# Patient Record
Sex: FEMALE | Race: WHITE | NOT HISPANIC OR LATINO | Employment: OTHER | URBAN - NONMETROPOLITAN AREA
[De-identification: names, ages, dates, MRNs, and addresses within clinical notes are randomized per-mention and may not be internally consistent; named-entity substitution may affect disease eponyms.]

---

## 2023-03-13 NOTE — PROGRESS NOTES
Name:  Marilyn Landry  YOB: 1945  Location: [facility]  Location Address: [address] [place] [address], [facility], [address] [place] [address]  Location Phone: [phone]    Chief Complaint  Chief Complaint   Patient presents with   • Skin Lesion     Scc of forehead        History of Present Illness  The patient  is a 77 y.o. female who is referred by Monica Parks MD for preoperative evaluation. She complains of a lesionright forehead present for 3 month(s).  It has been  biopsied.  Pathology demonstrated a squamous cell carcinoma                       Past Medical History:   Diagnosis Date   • Disease of thyroid gland    • High blood pressure    • High cholesterol    • SCC (squamous cell carcinoma)        Past Surgical History:   Procedure Laterality Date   • GALLBLADDER SURGERY     • HYSTERECTOMY           Current Outpatient Medications:   •  amLODIPine (NORVASC) 10 MG tablet, TAKE ONE TABLET EVERY DAY FOR HIGH BLOOD PRESSURE, Disp: , Rfl:   •  atenolol (TENORMIN) 50 MG tablet, atenolol 50 mg tablet  TAKE 1 TABLET BY MOUTH ONCE DAILY FOR HIGH BLOOD PRESSURE, Disp: , Rfl:   •  colestipol (COLESTID) 1 g tablet, colestipol 1 gram tablet  TAKE 1 TABLET BY MOUTH TWICE DAILY, Disp: , Rfl:   •  dicyclomine (BENTYL) 10 MG capsule, dicyclomine 10 mg capsule  TAKE 1 CAPSULE BY MOUTH THREE TIMES DAILY, Disp: , Rfl:   •  estradiol (ESTRACE) 0.1 MG/GM vaginal cream, INSERT 0.1 MG VAGINALLY EVERY EVENING, Disp: , Rfl:   •  gabapentin (NEURONTIN) 300 MG capsule, gabapentin 300 mg capsule  TAKE 1 CAPSULE BY MOUTH TWICE DAILY AS NEEDED, Disp: , Rfl:   •  hydroCHLOROthiazide (HYDRODIURIL) 25 MG tablet, hydrochlorothiazide 25 mg tablet  TAKE 1 TABLET BY MOUTH ONCE DAILY FOR HIGH BLOOD PRESSURE, Disp: , Rfl:   •  levothyroxine (SYNTHROID, LEVOTHROID) 112 MCG tablet, Synthroid 112 mcg tablet  TAKE 1 TABLET BY MOUTH ONCE DAILY IN THE MORNING ON AN EMPTY STOMACH, Disp: , Rfl:   •  lisinopril (PRINIVIL,ZESTRIL) 40  MG tablet, lisinopril 40 mg tablet  TAKE 1 TABLET BY MOUTH ONCE DAILY FOR HIGH BLOOD PRESSURE, Disp: , Rfl:   •  omeprazole (priLOSEC) 20 MG capsule, omeprazole 20 mg capsule,delayed release  TAKE 1 CAPSULE BY MOUTH ONCE DAILY FOR GERD, Disp: , Rfl:   •  pravastatin (PRAVACHOL) 20 MG tablet, pravastatin 20 mg tablet  TAKE 1 TABLET BY MOUTH EVERY DAY AT BEDTIME TO LOWER CHOLESTEROL, Disp: , Rfl:     Penicillins    History reviewed. No pertinent family history.    Social History     Socioeconomic History   • Marital status:    Tobacco Use   • Smoking status: Never   • Smokeless tobacco: Never   Substance and Sexual Activity   • Alcohol use: Never       Review of Systems   Constitutional: Negative.    HENT:        Admits lesion to the forehead   Respiratory: Negative.    Cardiovascular: Negative.    Gastrointestinal: Negative.    Genitourinary: Negative.    Neurological: Negative.        Vitals:    03/14/23 1618   BP: 136/74   Pulse: 65   Resp: 20   Temp: 98 °F (36.7 °C)       Objective     Physical Exam  Vitals reviewed.   Constitutional:       Appearance: Normal appearance. She is normal weight.   HENT:      Head: Normocephalic.        Right Ear: Hearing and external ear normal.      Left Ear: Hearing and external ear normal.      Nose: Nose normal.      Mouth/Throat:      Lips: Pink.      Mouth: Mucous membranes are moist.      Pharynx: Oropharynx is clear. Uvula midline.   Musculoskeletal:      Cervical back: Full passive range of motion without pain.   Neurological:      Mental Status: She is alert.   Psychiatric:         Behavior: Behavior is cooperative.         Assessment & Plan   Diagnoses and all orders for this visit:    1. 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 Abnormal coagulation profile    Other orders  -     Follow Anesthesia Guidelines / Protocol; Future  -     Obtain Informed Consent  -     Follow Anesthesia Guidelines / Protocol; Standing  -     Verify NPO Status; Standing  -     Obtain Informed Consent; Standing  -     SCD (Sequential Compression Device) - To Be Placed on Patient in Pre-Op; Standing      EXCISION OF SQUAMOUS CELL CARCINOMA OF THE RIGHT FOREHEAD WITH POSSIBLE FLAP OR GRAFT (Right)  Orders Placed This Encounter   Procedures   • XR Chest 2 View     Standing Status:   Future     Standing Expiration Date:   3/13/2024     Order Specific Question:   Reason for Exam:     Answer:   PRE OPERATIVE   • Comprehensive Metabolic Panel     Standing Status:   Future     Standing Expiration Date:   3/13/2024     Order Specific Question:   Release to patient     Answer:   Routine Release   • Protime-INR     Standing Status:   Future     Standing Expiration Date:   3/13/2024   • APTT     Standing Status:   Future     Standing Expiration Date:   3/13/2024   • Obtain Informed Consent     EXCISION LESION with possible flap or graft-     Order Specific Question:   Informed Consent Given For     Answer:   EXCISION LESION with possible flap or graft-   • ECG 12 Lead     Standing Status:   Future     Standing Expiration Date:   3/13/2024     Order Specific Question:   Reason for Exam:     Answer:   EXCISION LESION   • CBC and Differential     Standing Status:   Future     Standing Expiration Date:   3/13/2024     Order Specific Question:   Manual Differential     Answer:   No     The risks, benefits and options of the procedure were explained to the patient. The possiblity of a persistent cosmetic defect as well as a \"flap\" or a graft to close the defect was discussed. The patient was instructed to stop all aspirin, NSAIDs and VIT E etc.  If appropriate, clearance with primary MD or specialist will be obtained preoperatively.  The patient was " "scheduled for a LOCAL in the office.    For instructions on the proper use, care and disposal of controled substances, ask you doctor or refer to the [place] Board of Medicine website: http://kbml.[place].gov/hb1/Pages/Considerations-For-Patient-Education.aspx   Dr. Parks examined and discussed care with patient and agrees with treatment plan.     Return for post op.       Patient Instructions   The risks, benefits and options of the procedure were explained to the patient. The possiblity of a persistent cosmetic defect as well as a \"flap\" or a graft to close the defect was discussed. The patient was instructed to stop all aspirin, NSAIDs and VIT E etc.  If appropriate, clearance with primary MD or specialist will be obtained preoperatively.  The patient was scheduled for a LOCAL in the office.    For instructions on the proper use, care and disposal of controled substances, ask you doctor or refer to the [place] Board of Medicine website: http://kbml.[place].gov/hb1/Pages/Considerations-For-Patient-Education.aspx             "

## 2023-03-13 NOTE — H&P (VIEW-ONLY)
Name:  Marilyn Landry  YOB: 1945  Location: [facility]  Location Address: [address] [place] [address], [facility], [address] [place] [address]  Location Phone: [phone]    Chief Complaint  Chief Complaint   Patient presents with   • Skin Lesion     Scc of forehead        History of Present Illness  The patient  is a 77 y.o. female who is referred by Monica Parks MD for preoperative evaluation. She complains of a lesionright forehead present for 3 month(s).  It has been  biopsied.  Pathology demonstrated a squamous cell carcinoma                       Past Medical History:   Diagnosis Date   • Disease of thyroid gland    • High blood pressure    • High cholesterol    • SCC (squamous cell carcinoma)        Past Surgical History:   Procedure Laterality Date   • GALLBLADDER SURGERY     • HYSTERECTOMY           Current Outpatient Medications:   •  amLODIPine (NORVASC) 10 MG tablet, TAKE ONE TABLET EVERY DAY FOR HIGH BLOOD PRESSURE, Disp: , Rfl:   •  atenolol (TENORMIN) 50 MG tablet, atenolol 50 mg tablet  TAKE 1 TABLET BY MOUTH ONCE DAILY FOR HIGH BLOOD PRESSURE, Disp: , Rfl:   •  colestipol (COLESTID) 1 g tablet, colestipol 1 gram tablet  TAKE 1 TABLET BY MOUTH TWICE DAILY, Disp: , Rfl:   •  dicyclomine (BENTYL) 10 MG capsule, dicyclomine 10 mg capsule  TAKE 1 CAPSULE BY MOUTH THREE TIMES DAILY, Disp: , Rfl:   •  estradiol (ESTRACE) 0.1 MG/GM vaginal cream, INSERT 0.1 MG VAGINALLY EVERY EVENING, Disp: , Rfl:   •  gabapentin (NEURONTIN) 300 MG capsule, gabapentin 300 mg capsule  TAKE 1 CAPSULE BY MOUTH TWICE DAILY AS NEEDED, Disp: , Rfl:   •  hydroCHLOROthiazide (HYDRODIURIL) 25 MG tablet, hydrochlorothiazide 25 mg tablet  TAKE 1 TABLET BY MOUTH ONCE DAILY FOR HIGH BLOOD PRESSURE, Disp: , Rfl:   •  levothyroxine (SYNTHROID, LEVOTHROID) 112 MCG tablet, Synthroid 112 mcg tablet  TAKE 1 TABLET BY MOUTH ONCE DAILY IN THE MORNING ON AN EMPTY STOMACH, Disp: , Rfl:   •  lisinopril (PRINIVIL,ZESTRIL) 40  MG tablet, lisinopril 40 mg tablet  TAKE 1 TABLET BY MOUTH ONCE DAILY FOR HIGH BLOOD PRESSURE, Disp: , Rfl:   •  omeprazole (priLOSEC) 20 MG capsule, omeprazole 20 mg capsule,delayed release  TAKE 1 CAPSULE BY MOUTH ONCE DAILY FOR GERD, Disp: , Rfl:   •  pravastatin (PRAVACHOL) 20 MG tablet, pravastatin 20 mg tablet  TAKE 1 TABLET BY MOUTH EVERY DAY AT BEDTIME TO LOWER CHOLESTEROL, Disp: , Rfl:     Penicillins    History reviewed. No pertinent family history.    Social History     Socioeconomic History   • Marital status:    Tobacco Use   • Smoking status: Never   • Smokeless tobacco: Never   Substance and Sexual Activity   • Alcohol use: Never       Review of Systems   Constitutional: Negative.    HENT:        Admits lesion to the forehead   Respiratory: Negative.    Cardiovascular: Negative.    Gastrointestinal: Negative.    Genitourinary: Negative.    Neurological: Negative.        Vitals:    03/14/23 1618   BP: 136/74   Pulse: 65   Resp: 20   Temp: 98 °F (36.7 °C)       Objective     Physical Exam  Vitals reviewed.   Constitutional:       Appearance: Normal appearance. She is normal weight.   HENT:      Head: Normocephalic.        Right Ear: Hearing and external ear normal.      Left Ear: Hearing and external ear normal.      Nose: Nose normal.      Mouth/Throat:      Lips: Pink.      Mouth: Mucous membranes are moist.      Pharynx: Oropharynx is clear. Uvula midline.   Musculoskeletal:      Cervical back: Full passive range of motion without pain.   Neurological:      Mental Status: She is alert.   Psychiatric:         Behavior: Behavior is cooperative.         Assessment & Plan   Diagnoses and all orders for this visit:    1. 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 Abnormal coagulation profile    Other orders  -     Follow Anesthesia Guidelines / Protocol; Future  -     Obtain Informed Consent  -     Follow Anesthesia Guidelines / Protocol; Standing  -     Verify NPO Status; Standing  -     Obtain Informed Consent; Standing  -     SCD (Sequential Compression Device) - To Be Placed on Patient in Pre-Op; Standing      EXCISION OF SQUAMOUS CELL CARCINOMA OF THE RIGHT FOREHEAD WITH POSSIBLE FLAP OR GRAFT (Right)  Orders Placed This Encounter   Procedures   • XR Chest 2 View     Standing Status:   Future     Standing Expiration Date:   3/13/2024     Order Specific Question:   Reason for Exam:     Answer:   PRE OPERATIVE   • Comprehensive Metabolic Panel     Standing Status:   Future     Standing Expiration Date:   3/13/2024     Order Specific Question:   Release to patient     Answer:   Routine Release   • Protime-INR     Standing Status:   Future     Standing Expiration Date:   3/13/2024   • APTT     Standing Status:   Future     Standing Expiration Date:   3/13/2024   • Obtain Informed Consent     EXCISION LESION with possible flap or graft-     Order Specific Question:   Informed Consent Given For     Answer:   EXCISION LESION with possible flap or graft-   • ECG 12 Lead     Standing Status:   Future     Standing Expiration Date:   3/13/2024     Order Specific Question:   Reason for Exam:     Answer:   EXCISION LESION   • CBC and Differential     Standing Status:   Future     Standing Expiration Date:   3/13/2024     Order Specific Question:   Manual Differential     Answer:   No     The risks, benefits and options of the procedure were explained to the patient. The possiblity of a persistent cosmetic defect as well as a \"flap\" or a graft to close the defect was discussed. The patient was instructed to stop all aspirin, NSAIDs and VIT E etc.  If appropriate, clearance with primary MD or specialist will be obtained preoperatively.  The patient was " "scheduled for a LOCAL in the office.    For instructions on the proper use, care and disposal of controled substances, ask you doctor or refer to the [place] Board of Medicine website: http://kbml.[place].gov/hb1/Pages/Considerations-For-Patient-Education.aspx   Dr. Parks examined and discussed care with patient and agrees with treatment plan.     Return for post op.       Patient Instructions   The risks, benefits and options of the procedure were explained to the patient. The possiblity of a persistent cosmetic defect as well as a \"flap\" or a graft to close the defect was discussed. The patient was instructed to stop all aspirin, NSAIDs and VIT E etc.  If appropriate, clearance with primary MD or specialist will be obtained preoperatively.  The patient was scheduled for a LOCAL in the office.    For instructions on the proper use, care and disposal of controled substances, ask you doctor or refer to the [place] Board of Medicine website: http://kbml.[place].gov/hb1/Pages/Considerations-For-Patient-Education.aspx             "

## 2023-03-14 ENCOUNTER — OFFICE VISIT (OUTPATIENT)
Dept: OTOLARYNGOLOGY | Facility: CLINIC | Age: 78
End: 2023-03-14
Payer: MEDICARE

## 2023-03-14 VITALS
HEIGHT: 62 IN | SYSTOLIC BLOOD PRESSURE: 136 MMHG | RESPIRATION RATE: 20 BRPM | BODY MASS INDEX: 26.87 KG/M2 | TEMPERATURE: 98 F | HEART RATE: 65 BPM | WEIGHT: 146 LBS | DIASTOLIC BLOOD PRESSURE: 74 MMHG

## 2023-03-14 DIAGNOSIS — D04.39 SQUAMOUS CELL CARCINOMA IN SITU (SCCIS) OF SKIN OF FOREHEAD: Primary | ICD-10-CM

## 2023-03-14 DIAGNOSIS — R79.1 ABNORMAL COAGULATION PROFILE: ICD-10-CM

## 2023-03-14 PROCEDURE — 99204 OFFICE O/P NEW MOD 45 MIN: CPT | Performed by: NURSE PRACTITIONER

## 2023-03-14 PROCEDURE — 1159F MED LIST DOCD IN RCRD: CPT | Performed by: NURSE PRACTITIONER

## 2023-03-14 PROCEDURE — 1160F RVW MEDS BY RX/DR IN RCRD: CPT | Performed by: NURSE PRACTITIONER

## 2023-03-14 RX ORDER — GABAPENTIN 300 MG/1
300 CAPSULE ORAL DAILY
COMMUNITY

## 2023-03-14 RX ORDER — HYDROCHLOROTHIAZIDE 25 MG/1
25 TABLET ORAL DAILY
COMMUNITY

## 2023-03-14 RX ORDER — AMLODIPINE BESYLATE 10 MG/1
10 TABLET ORAL DAILY
COMMUNITY
Start: 2022-12-12

## 2023-03-14 RX ORDER — ESTRADIOL 0.1 MG/G
2 CREAM VAGINAL AS NEEDED
COMMUNITY
Start: 2022-12-12

## 2023-03-14 RX ORDER — FAMOTIDINE 40 MG/1
TABLET, FILM COATED ORAL EVERY 24 HOURS
COMMUNITY
End: 2023-03-14

## 2023-03-14 RX ORDER — PRAVASTATIN SODIUM 20 MG
20 TABLET ORAL DAILY
COMMUNITY

## 2023-03-14 RX ORDER — ATENOLOL 50 MG/1
50 TABLET ORAL DAILY
COMMUNITY

## 2023-03-14 RX ORDER — DICYCLOMINE HYDROCHLORIDE 10 MG/1
10 CAPSULE ORAL 2 TIMES DAILY PRN
COMMUNITY

## 2023-03-14 RX ORDER — LISINOPRIL 40 MG/1
TABLET ORAL
COMMUNITY
End: 2023-03-29

## 2023-03-14 RX ORDER — MONTELUKAST SODIUM 4 MG/1
1 TABLET, CHEWABLE ORAL 2 TIMES DAILY
COMMUNITY

## 2023-03-14 RX ORDER — LEVOTHYROXINE SODIUM 112 UG/1
112 TABLET ORAL DAILY
COMMUNITY

## 2023-03-14 RX ORDER — OMEPRAZOLE 20 MG/1
20 CAPSULE, DELAYED RELEASE ORAL DAILY
COMMUNITY

## 2023-03-14 NOTE — PATIENT INSTRUCTIONS
"The risks, benefits and options of the procedure were explained to the patient. The possiblity of a persistent cosmetic defect as well as a \"flap\" or a graft to close the defect was discussed. The patient was instructed to stop all aspirin, NSAIDs and VIT E etc.  If appropriate, clearance with primary MD or specialist will be obtained preoperatively.  The patient was scheduled for a LOCAL in the office.    For instructions on the proper use, care and disposal of controled substances, ask you doctor or refer to the [place] Board of Medicine website: http://kbml.[place].gov/hb1/Pages/Considerations-For-Patient-Education.aspx   "

## 2023-03-16 PROBLEM — D04.39 SQUAMOUS CELL CARCINOMA IN SITU (SCCIS) OF SKIN OF FOREHEAD: Status: ACTIVE | Noted: 2023-03-16

## 2023-03-29 ENCOUNTER — HOSPITAL ENCOUNTER (OUTPATIENT)
Dept: GENERAL RADIOLOGY | Facility: HOSPITAL | Age: 78
Discharge: HOME | End: 2023-03-29
Payer: MEDICARE

## 2023-03-29 ENCOUNTER — PRE-ADMISSION TESTING (OUTPATIENT)
Dept: PREADMISSION TESTING | Facility: HOSPITAL | Age: 78
End: 2023-03-29
Payer: MEDICARE

## 2023-03-29 VITALS
OXYGEN SATURATION: 98 % | SYSTOLIC BLOOD PRESSURE: 167 MMHG | DIASTOLIC BLOOD PRESSURE: 47 MMHG | RESPIRATION RATE: 16 BRPM | BODY MASS INDEX: 29.26 KG/M2 | HEIGHT: 60 IN | WEIGHT: 149.03 LBS | HEART RATE: 63 BPM

## 2023-03-29 DIAGNOSIS — D04.39 SQUAMOUS CELL CARCINOMA IN SITU (SCCIS) OF SKIN OF FOREHEAD: ICD-10-CM

## 2023-03-29 LAB
ALBUMIN SERPL-MCNC: 4.5 G/DL (ref 3.5–5.2)
ALBUMIN/GLOB SERPL: 1.3 G/DL
ALP SERPL-CCNC: 75 U/L (ref 39–117)
ALT SERPL W P-5'-P-CCNC: 13 U/L (ref 1–33)
ANION GAP SERPL CALCULATED.3IONS-SCNC: 12 MMOL/L (ref 5–15)
APTT PPP: 26.8 SECONDS (ref 24.1–35)
AST SERPL-CCNC: 19 U/L (ref 1–32)
BASOPHILS # BLD AUTO: 0.08 10*3/MM3 (ref 0–0.2)
BASOPHILS NFR BLD AUTO: 0.8 % (ref 0–1.5)
BILIRUB SERPL-MCNC: 0.2 MG/DL (ref 0–1.2)
BUN SERPL-MCNC: 10 MG/DL (ref 8–23)
BUN/CREAT SERPL: 14.3 (ref 7–25)
CALCIUM SPEC-SCNC: 9.5 MG/DL (ref 8.6–10.5)
CHLORIDE SERPL-SCNC: 104 MMOL/L (ref 98–107)
CO2 SERPL-SCNC: 27 MMOL/L (ref 22–29)
CREAT SERPL-MCNC: 0.7 MG/DL (ref 0.57–1)
DEPRECATED RDW RBC AUTO: 45.8 FL (ref 37–54)
EGFRCR SERPLBLD CKD-EPI 2021: 88.7 ML/MIN/1.73
EOSINOPHIL # BLD AUTO: 0.21 10*3/MM3 (ref 0–0.4)
EOSINOPHIL NFR BLD AUTO: 2.1 % (ref 0.3–6.2)
ERYTHROCYTE [DISTWIDTH] IN BLOOD BY AUTOMATED COUNT: 14.4 % (ref 12.3–15.4)
GLOBULIN UR ELPH-MCNC: 3.5 GM/DL
GLUCOSE SERPL-MCNC: 108 MG/DL (ref 65–99)
HCT VFR BLD AUTO: 45.1 % (ref 34–46.6)
HGB BLD-MCNC: 14 G/DL (ref 12–15.9)
IMM GRANULOCYTES # BLD AUTO: 0.05 10*3/MM3 (ref 0–0.05)
IMM GRANULOCYTES NFR BLD AUTO: 0.5 % (ref 0–0.5)
INR PPP: 1 (ref 0.91–1.09)
LYMPHOCYTES # BLD AUTO: 3.3 10*3/MM3 (ref 0.7–3.1)
LYMPHOCYTES NFR BLD AUTO: 33.4 % (ref 19.6–45.3)
MCH RBC QN AUTO: 27 PG (ref 26.6–33)
MCHC RBC AUTO-ENTMCNC: 31 G/DL (ref 31.5–35.7)
MCV RBC AUTO: 86.9 FL (ref 79–97)
MONOCYTES # BLD AUTO: 1.06 10*3/MM3 (ref 0.1–0.9)
MONOCYTES NFR BLD AUTO: 10.7 % (ref 5–12)
NEUTROPHILS NFR BLD AUTO: 5.17 10*3/MM3 (ref 1.7–7)
NEUTROPHILS NFR BLD AUTO: 52.5 % (ref 42.7–76)
NRBC BLD AUTO-RTO: 0 /100 WBC (ref 0–0.2)
PLATELET # BLD AUTO: 275 10*3/MM3 (ref 140–450)
PMV BLD AUTO: 10.4 FL (ref 6–12)
POTASSIUM SERPL-SCNC: 3.7 MMOL/L (ref 3.5–5.2)
PROT SERPL-MCNC: 8 G/DL (ref 6–8.5)
PROTHROMBIN TIME: 13.4 SECONDS (ref 11.8–14.8)
RBC # BLD AUTO: 5.19 10*6/MM3 (ref 3.77–5.28)
SODIUM SERPL-SCNC: 143 MMOL/L (ref 136–145)
WBC NRBC COR # BLD: 9.87 10*3/MM3 (ref 3.4–10.8)

## 2023-03-29 PROCEDURE — 71046 X-RAY EXAM CHEST 2 VIEWS: CPT

## 2023-03-29 PROCEDURE — 80053 COMPREHEN METABOLIC PANEL: CPT

## 2023-03-29 PROCEDURE — 36415 COLL VENOUS BLD VENIPUNCTURE: CPT

## 2023-03-29 PROCEDURE — 85025 COMPLETE CBC W/AUTO DIFF WBC: CPT

## 2023-03-29 PROCEDURE — 85610 PROTHROMBIN TIME: CPT

## 2023-03-29 PROCEDURE — 93005 ELECTROCARDIOGRAM TRACING: CPT

## 2023-03-29 PROCEDURE — 93010 ELECTROCARDIOGRAM REPORT: CPT | Performed by: INTERNAL MEDICINE

## 2023-03-29 PROCEDURE — 85730 THROMBOPLASTIN TIME PARTIAL: CPT

## 2023-03-29 NOTE — DISCHARGE INSTRUCTIONS
Before you come to the hospital        Arrival time: AS DIRECTED BY OFFICE     YOU MAY TAKE THE FOLLOWING MEDICATION(S) THE MORNING OF SURGERY WITH A SIP OF WATER: GABAPENTIN, ATENOLOL             ALL OTHER HOME MEDICATION CHECK WITH YOUR PHYSICIAN (especially if   you are taking diabetes medicines or blood thinners)            Eating and drinking restrictions prior to scheduled arrival time    2 Hours before arrival time STOP   Drinking Clear liquids (water, apple juice-no pulp)     6 Hours before arrival time STOP   Milk or drinks that contain milk, full liquids    6 Hours before arrival time STOP   Light meals or foods, such as toast or cereal    8 Hours before arrival time STOP   Heavy foods, such as meat, fried foods, or fatty foods    (It is extremely important that you follow these guidelines to prevent delay or cancelation of your procedure)     Clear Liquids  Water and flavored water                                                                      Clear Fruit juices, such as cranberry juice and apple juice.  Black coffee (NO cream of any kind, including powdered).  Plain tea  Clear bouillon or broth.  Flavored gelatin.  Soda.  Gatorade or Powerade.  Full liquid examples  Juices that have pulp.  Frozen ice pops that contain fruit pieces.  Coffee with creamer  Milk.  Yogurt.                MANAGING PAIN AFTER SURGERY    We know you are probably wondering what your pain will be like after surgery.  Following surgery it is unrealistic to expect you will not have pain.   Pain is how our bodies let us know that something is wrong or cautions us to be careful.  That said, our goal is to make your pain tolerable.    Methods we may use to treat your pain include (oral or IV medications, PCAs, epidurals, nerve blocks, etc.)   While some procedures require IV pain medications for a short time after surgery, transitioning to pain medications by mouth allows for better management of pain.   Your nurse will encourage  you to take oral pain medications whenever possible.  IV medications work almost immediately, but only last a short while.  Taking medications by mouth allows for a more constant level of medication in your blood stream for a longer period of time.      Once your pain is out of control it is harder to get back under control.  It is important you are aware when your next dose of pain medication is due.  If you are admitted, your nurse may write the time of your next dose on the white board in your room to help you remember.      We are interested in your pain and encourage you to inform us about aggravating factors during your visit.   Many times a simple repositioning every few hours can make a big difference.    If your physician says it is okay, do not let your pain prevent you from getting out of bed. Be sure to call your nurse for assistance prior to getting up so you do not fall.      Before surgery, please decide your tolerable pain goal.  These faces help describe the pain ratings we use on a 0-10 scale.   Be prepared to tell us your goal and whether or not you take pain or anxiety medications at home.          Preparing for Surgery  Preparing for surgery is an important part of your care. It can make things go more smoothly and help you avoid complications. The steps leading up to surgery may vary among hospitals. Follow all instructions given to you by your health care providers. Ask questions if you do not understand something. Talk about any concerns that you have.  Here are some questions to consider asking before your surgery:  If my surgery is not an emergency (is elective), when would be the best time to have the surgery?  What arrangements do I need to make for work, home, or school?  What will my recovery be like? How long will it be before I can return to normal activities?  Will I need to prepare my home? Will I need to arrange care for me or my children?  Should I expect to have pain after  surgery? What are my pain management options? Are there nonmedical options that I can try for pain?  Tell a health care provider about:  Any allergies you have.  All medicines you are taking, including vitamins, herbs, eye drops, creams, and over-the-counter medicines.  Any problems you or family members have had with anesthetic medicines.  Any blood disorders you have.  Any surgeries you have had.  Any medical conditions you have.  Whether you are pregnant or may be pregnant.  What are the risks?  The risks and complications of surgery depend on the specific procedure that you have. Discuss all the risks with your health care providers before your surgery. Ask about common surgical complications, which may include:  Infection.  Bleeding or a need for blood replacement (transfusion).  Allergic reactions to medicines.  Damage to surrounding nerves, tissues, or structures.  A blood clot.  Scarring.  Failure of the surgery to correct the problem.  Follow these instructions before the procedure:  Several days or weeks before your procedure  You may have a physical exam by your primary health care provider to make sure it is safe for you to have surgery.  You may have testing. This may include a chest X-ray, blood and urine tests, electrocardiogram (ECG), or other testing.  Ask your health care provider about:  Changing or stopping your regular medicines. This is especially important if you are taking diabetes medicines or blood thinners.  Taking medicines such as aspirin and ibuprofen. These medicines can thin your blood. Do not take these medicines unless your health care provider tells you to take them.  Taking over-the-counter medicines, vitamins, herbs, and supplements.  Do not use any products that contain nicotine or tobacco, such as cigarettes and e-cigarettes. If you need help quitting, ask your health care provider.  Avoid alcohol.  Ask your health care provider if there are exercises you can do to prepare for  surgery.  Eat a healthy diet.   Plan to have someone 18 years of age or older to take you home from the hospital. We will need to verify your ride on the morning of surgery if you are being discharged home on the same day. Tell your ride to be expecting a call from the hospital prior to your procedure.   Plan to have a responsible adult care for you for at least 24 hours after you leave the hospital or clinic. This is important.  The day before your procedure  You may be given antibiotic medicine to take by mouth to help prevent infection. Take it as told by your health care provider.  You may be asked to shower with a germ-killing soap.  Follow instructions from your health care provider about eating and drinking restrictions. This includes gum, mints and hard candy.  Pack comfortable clothes according to your procedure.   The day of your procedure  You may need to take another shower with a germ-killing soap before you leave home in the morning.  With a small sip of water, take only the medicines that you are told to take.  Remove all jewelry including rings.   Leave anything you consider valuable at home except hearing aids if needed.  You do not need to bring your home medications into the hospital.   Do not wear any makeup, nail polish, powder, deodorant, lotion, hair accessories, or anything on your skin or body except your clothes.  If you will be staying in the hospital, bring a case to hold your glasses, contacts, or dentures. You may also want to bring your robe and non-skid footwear.       (Do not use denture adhesives since you will be asked to remove them during  surgery).   If you wear oxygen at home, bring it with you the day of surgery.  If instructed by your health care provider, bring your sleep apnea device with you on the day of your surgery (if this applies to you).  You may want to leave your suitcase and sleep apnea device in the car until after surgery.   Arrive at the hospital as  scheduled.  Bring a friend or family member with you who can help to answer questions and be present while you meet with your health care provider.  At the hospital  When you arrive at the hospital:  Go to registration located at the main entrance of the hospital. You will be registered and given a beeper and a sticker sheet. Take the stickers to the Outpatient nurses desk and place in the black tray. This is to notify staff that you have arrived. Then return to the lobby to wait.   When your beeper lights up and vibrates proceed through the double doors, under the stairs, and a member of the Outpatient Surgery staff will escort you to your preoperative room.  You may have to wear compression sleeves. These help to prevent blood clots and reduce swelling in your legs.  An IV may be inserted into one of your veins.              In the operating room, you may be given one or more of the following:        A medicine to help you relax (sedative).        A medicine to numb the area (local anesthetic).        A medicine to make you fall asleep (general anesthetic).        A medicine that is injected into an area of your body to numb everything below the                      injection site (regional anesthetic).  You may be given an antibiotic through your IV to help prevent infection.  Your surgical site will be marked or identified.    Contact a health care provider if you:  Develop a fever of more than 100.4°F (38°C) or other feelings of illness during the 48 hours before your surgery.  Have symptoms that get worse.  Have questions or concerns about your surgery.  Summary  Preparing for surgery can make the procedure go more smoothly and lower your risk of complications.  Before surgery, make a list of questions and concerns to discuss with your surgeon. Ask about the risks and possible complications.  In the days or weeks before your surgery, follow all instructions from your health care provider. You may need to stop  smoking, avoid alcohol, follow eating restrictions, and change or stop your regular medicines.  Contact your surgeon if you develop a fever or other signs of illness during the few days before your surgery.  This information is not intended to replace advice given to you by your health care provider. Make sure you discuss any questions you have with your health care provider.  Document Revised: 12/21/2018 Document Reviewed: 10/23/2018  Elsevier Patient Education © 2021 [employer]

## 2023-03-30 LAB
QT INTERVAL: 450 MS
QTC INTERVAL: 441 MS

## 2023-04-05 ENCOUNTER — HOSPITAL ENCOUNTER (OUTPATIENT)
Facility: HOSPITAL | Age: 78
Setting detail: HOSPITAL OUTPATIENT SURGERY
Discharge: HOME | End: 2023-04-05
Attending: OTOLARYNGOLOGY | Admitting: OTOLARYNGOLOGY
Payer: MEDICARE

## 2023-04-05 ENCOUNTER — ANESTHESIA EVENT (OUTPATIENT)
Dept: PERIOP | Facility: HOSPITAL | Age: 78
End: 2023-04-05
Payer: MEDICARE

## 2023-04-05 ENCOUNTER — ANESTHESIA (OUTPATIENT)
Dept: PERIOP | Facility: HOSPITAL | Age: 78
End: 2023-04-05
Payer: MEDICARE

## 2023-04-05 VITALS
HEART RATE: 54 BPM | RESPIRATION RATE: 16 BRPM | OXYGEN SATURATION: 98 % | SYSTOLIC BLOOD PRESSURE: 141 MMHG | TEMPERATURE: 97.3 F | DIASTOLIC BLOOD PRESSURE: 57 MMHG

## 2023-04-05 DIAGNOSIS — D04.39 SQUAMOUS CELL CARCINOMA IN SITU (SCCIS) OF SKIN OF FOREHEAD: Primary | ICD-10-CM

## 2023-04-05 PROCEDURE — 25010000002 PROPOFOL 200 MG/20ML EMULSION: Performed by: NURSE ANESTHETIST, CERTIFIED REGISTERED

## 2023-04-05 PROCEDURE — 14040 TIS TRNFR F/C/C/M/N/A/G/H/F: CPT | Performed by: OTOLARYNGOLOGY

## 2023-04-05 PROCEDURE — 88331 PATH CONSLTJ SURG 1 BLK 1SPC: CPT | Performed by: PATHOLOGY

## 2023-04-05 PROCEDURE — 88305 TISSUE EXAM BY PATHOLOGIST: CPT | Performed by: OTOLARYNGOLOGY

## 2023-04-05 RX ORDER — FENTANYL CITRATE 50 UG/ML
25 INJECTION, SOLUTION INTRAMUSCULAR; INTRAVENOUS
Status: DISCONTINUED | OUTPATIENT
Start: 2023-04-05 | End: 2023-04-05 | Stop reason: HOSPADM

## 2023-04-05 RX ORDER — SODIUM CHLORIDE 9 MG/ML
40 INJECTION, SOLUTION INTRAVENOUS AS NEEDED
Status: DISCONTINUED | OUTPATIENT
Start: 2023-04-05 | End: 2023-04-05 | Stop reason: HOSPADM

## 2023-04-05 RX ORDER — IBUPROFEN 600 MG/1
600 TABLET ORAL ONCE AS NEEDED
Status: DISCONTINUED | OUTPATIENT
Start: 2023-04-05 | End: 2023-04-05 | Stop reason: HOSPADM

## 2023-04-05 RX ORDER — NALOXONE HCL 0.4 MG/ML
0.4 VIAL (ML) INJECTION AS NEEDED
Status: DISCONTINUED | OUTPATIENT
Start: 2023-04-05 | End: 2023-04-05 | Stop reason: HOSPADM

## 2023-04-05 RX ORDER — SODIUM CHLORIDE, SODIUM LACTATE, POTASSIUM CHLORIDE, CALCIUM CHLORIDE 600; 310; 30; 20 MG/100ML; MG/100ML; MG/100ML; MG/100ML
100 INJECTION, SOLUTION INTRAVENOUS CONTINUOUS
Status: DISCONTINUED | OUTPATIENT
Start: 2023-04-05 | End: 2023-04-05 | Stop reason: HOSPADM

## 2023-04-05 RX ORDER — LIDOCAINE HYDROCHLORIDE AND EPINEPHRINE 10; 10 MG/ML; UG/ML
INJECTION, SOLUTION INFILTRATION; PERINEURAL AS NEEDED
Status: DISCONTINUED | OUTPATIENT
Start: 2023-04-05 | End: 2023-04-05 | Stop reason: HOSPADM

## 2023-04-05 RX ORDER — SODIUM CHLORIDE 0.9 % (FLUSH) 0.9 %
3 SYRINGE (ML) INJECTION AS NEEDED
Status: DISCONTINUED | OUTPATIENT
Start: 2023-04-05 | End: 2023-04-05 | Stop reason: HOSPADM

## 2023-04-05 RX ORDER — OXYCODONE HYDROCHLORIDE AND ACETAMINOPHEN 5; 325 MG/1; MG/1
1 TABLET ORAL ONCE AS NEEDED
Status: DISCONTINUED | OUTPATIENT
Start: 2023-04-05 | End: 2023-04-05 | Stop reason: HOSPADM

## 2023-04-05 RX ORDER — HYDROCODONE BITARTRATE AND ACETAMINOPHEN 5; 325 MG/1; MG/1
1 TABLET ORAL ONCE AS NEEDED
Status: DISCONTINUED | OUTPATIENT
Start: 2023-04-05 | End: 2023-04-05 | Stop reason: HOSPADM

## 2023-04-05 RX ORDER — LIDOCAINE HYDROCHLORIDE 10 MG/ML
0.5 INJECTION, SOLUTION EPIDURAL; INFILTRATION; INTRACAUDAL; PERINEURAL ONCE AS NEEDED
Status: DISCONTINUED | OUTPATIENT
Start: 2023-04-05 | End: 2023-04-05 | Stop reason: HOSPADM

## 2023-04-05 RX ORDER — ONDANSETRON 2 MG/ML
4 INJECTION INTRAMUSCULAR; INTRAVENOUS ONCE AS NEEDED
Status: DISCONTINUED | OUTPATIENT
Start: 2023-04-05 | End: 2023-04-05 | Stop reason: HOSPADM

## 2023-04-05 RX ORDER — ONDANSETRON 4 MG/1
4 TABLET, FILM COATED ORAL ONCE AS NEEDED
Status: DISCONTINUED | OUTPATIENT
Start: 2023-04-05 | End: 2023-04-05 | Stop reason: HOSPADM

## 2023-04-05 RX ORDER — HYDROCODONE BITARTRATE AND ACETAMINOPHEN 5; 325 MG/1; MG/1
1 TABLET ORAL EVERY 8 HOURS PRN
Qty: 6 TABLET | Refills: 0 | Status: SHIPPED | OUTPATIENT
Start: 2023-04-05

## 2023-04-05 RX ORDER — SODIUM CHLORIDE 0.9 % (FLUSH) 0.9 %
3 SYRINGE (ML) INJECTION EVERY 12 HOURS SCHEDULED
Status: DISCONTINUED | OUTPATIENT
Start: 2023-04-05 | End: 2023-04-05 | Stop reason: HOSPADM

## 2023-04-05 RX ORDER — LABETALOL HYDROCHLORIDE 5 MG/ML
5 INJECTION, SOLUTION INTRAVENOUS
Status: DISCONTINUED | OUTPATIENT
Start: 2023-04-05 | End: 2023-04-05 | Stop reason: HOSPADM

## 2023-04-05 RX ORDER — OXYCODONE AND ACETAMINOPHEN 10; 325 MG/1; MG/1
1 TABLET ORAL EVERY 4 HOURS PRN
Status: DISCONTINUED | OUTPATIENT
Start: 2023-04-05 | End: 2023-04-05 | Stop reason: HOSPADM

## 2023-04-05 RX ORDER — SODIUM CHLORIDE 0.9 % (FLUSH) 0.9 %
3-10 SYRINGE (ML) INJECTION AS NEEDED
Status: DISCONTINUED | OUTPATIENT
Start: 2023-04-05 | End: 2023-04-05 | Stop reason: HOSPADM

## 2023-04-05 RX ORDER — SODIUM CHLORIDE, SODIUM LACTATE, POTASSIUM CHLORIDE, CALCIUM CHLORIDE 600; 310; 30; 20 MG/100ML; MG/100ML; MG/100ML; MG/100ML
1000 INJECTION, SOLUTION INTRAVENOUS CONTINUOUS
Status: DISCONTINUED | OUTPATIENT
Start: 2023-04-05 | End: 2023-04-05 | Stop reason: HOSPADM

## 2023-04-05 RX ORDER — FLUMAZENIL 0.1 MG/ML
0.2 INJECTION INTRAVENOUS AS NEEDED
Status: DISCONTINUED | OUTPATIENT
Start: 2023-04-05 | End: 2023-04-05 | Stop reason: HOSPADM

## 2023-04-05 RX ORDER — MAGNESIUM HYDROXIDE 1200 MG/15ML
LIQUID ORAL AS NEEDED
Status: DISCONTINUED | OUTPATIENT
Start: 2023-04-05 | End: 2023-04-05 | Stop reason: HOSPADM

## 2023-04-05 RX ORDER — LIDOCAINE HYDROCHLORIDE 20 MG/ML
INJECTION, SOLUTION EPIDURAL; INFILTRATION; INTRACAUDAL; PERINEURAL AS NEEDED
Status: DISCONTINUED | OUTPATIENT
Start: 2023-04-05 | End: 2023-04-05 | Stop reason: SURG

## 2023-04-05 RX ORDER — PROPOFOL 10 MG/ML
INJECTION, EMULSION INTRAVENOUS AS NEEDED
Status: DISCONTINUED | OUTPATIENT
Start: 2023-04-05 | End: 2023-04-05 | Stop reason: SURG

## 2023-04-05 RX ADMIN — PROPOFOL INJECTABLE EMULSION 50 MG: 10 INJECTION, EMULSION INTRAVENOUS at 12:24

## 2023-04-05 RX ADMIN — PROPOFOL INJECTABLE EMULSION 100 MCG/KG/MIN: 10 INJECTION, EMULSION INTRAVENOUS at 12:26

## 2023-04-05 RX ADMIN — SODIUM CHLORIDE, POTASSIUM CHLORIDE, SODIUM LACTATE AND CALCIUM CHLORIDE 1000 ML: 600; 310; 30; 20 INJECTION, SOLUTION INTRAVENOUS at 09:45

## 2023-04-05 RX ADMIN — LIDOCAINE HYDROCHLORIDE 100 MG: 20 INJECTION, SOLUTION EPIDURAL; INFILTRATION; INTRACAUDAL; PERINEURAL at 12:24

## 2023-04-05 NOTE — OP NOTE
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     Approximately 6mL 1% Xylocaine with epinephrine was injected in the planned excision site in the right forehead near the hairline.  Excision was accomplished with a #15 blade in an oblong fashion without difficulty.  The excision was approximately 2.2 cm  x 2.0 cm.  The lesion was approximately 1.8 cm x 1.9 cm.  The margin was 1 mm. The excision extended to muscle.    Upon excision the specimen was marked and submitted for frozen section examination which demonstrated findings consistent with a squamous cell carcinoma with margins free of involvement with tumor.    An inferiorly based transposition flap was fashioned and wide undermining performed with curved iris scissors and double prong skin hooks.  Minimal bleeding was encountered which was controlled with electrocautery and low settings.  The flap was created to preserve normal anatomic relationships and to facilitate closure.  The flap was transposed and the donor site as well as the flap sutured in place utilizing interrupted 4-0 Monocryl subcutaneously and a running locked 5-0 nylon to  reapproximate the epidermis.  Bactroban ointment was applied and the procedure terminated.     The patient was transported upon extubation to the postanesthesia care unit in stable condition.    SPECIMENS:  A: Squamous cell carcinoma of the forehead    COMPLICATIONS: NONE    ESTIMATED BLOOD LOSS:  Minimal    Jeffrey Parks MD  4/5/2023

## 2023-04-05 NOTE — ANESTHESIA PREPROCEDURE EVALUATION
Anesthesia Evaluation     no history of anesthetic complications:  NPO Solid Status: > 8 hours  NPO Liquid Status: > 8 hours           Airway   Mallampati: III  TM distance: >3 FB  Neck ROM: full  Possible difficult intubation and Small opening  Dental    (+) partials    Pulmonary    Cardiovascular   Exercise tolerance: good (4-7 METS)    (+) hypertension, hyperlipidemia,   (-) CAD      Neuro/Psych  (-) seizures, TIA, CVA  GI/Hepatic/Renal/Endo    (+)  GERD,  thyroid problem hypothyroidism  (-) liver disease, no renal disease, diabetes    Musculoskeletal     Abdominal    Substance History      OB/GYN          Other                        Anesthesia Plan    ASA 2     MAC     intravenous induction     Anesthetic plan, risks, benefits, and alternatives have been provided, discussed and informed consent has been obtained with: patient.        CODE STATUS:

## 2023-04-05 NOTE — ANESTHESIA POSTPROCEDURE EVALUATION
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        Post Anesthesia Care and Evaluation    Patient location during evaluation: PHASE II  Patient participation: complete - patient participated  Level of consciousness: awake  Pain score: 0  Pain management: adequate    Airway patency: patent  Anesthetic complications: No anesthetic complications  PONV Status: none  Cardiovascular status: acceptable  Respiratory status: acceptable  Hydration status: acceptable    Comments: Blood pressure 154/52, pulse 56, temperature 97.3 °F (36.3 °C), temperature source Temporal, resp. rate 18, SpO2 99 %.

## 2023-04-07 LAB
CYTO UR: NORMAL
LAB AP CASE REPORT: NORMAL
Lab: NORMAL
Lab: NORMAL
PATH REPORT.FINAL DX SPEC: NORMAL
PATH REPORT.GROSS SPEC: NORMAL

## 2023-04-14 ENCOUNTER — OFFICE VISIT (OUTPATIENT)
Dept: OTOLARYNGOLOGY | Facility: CLINIC | Age: 78
End: 2023-04-14
Payer: MEDICARE

## 2023-04-14 DIAGNOSIS — D04.39 SQUAMOUS CELL CARCINOMA IN SITU (SCCIS) OF SKIN OF FOREHEAD: Primary | ICD-10-CM

## 2023-04-14 PROCEDURE — 99024 POSTOP FOLLOW-UP VISIT: CPT | Performed by: OTOLARYNGOLOGY

## 2023-04-14 NOTE — PROGRESS NOTES
Procedure   Suture Removal    Date/Time: 4/14/2023 11:32 AM  Performed by: Talia Nguyen RN  Authorized by: Jeffrey Parks MD   Consent: Verbal consent obtained.  Consent given by: patient  Patient identity confirmed: verbally with patient  Body area: head/neck  Location details: forehead  Comments: Patient presents for suture removal. The incision is well-approximated with no redness or edema noted. Patient notified of path

## 2024-01-17 ENCOUNTER — OFFICE VISIT (OUTPATIENT)
Dept: OTOLARYNGOLOGY | Facility: CLINIC | Age: 79
End: 2024-01-17
Payer: MEDICARE

## 2024-01-17 VITALS
BODY MASS INDEX: 27.88 KG/M2 | HEART RATE: 61 BPM | TEMPERATURE: 97.5 F | HEIGHT: 60 IN | SYSTOLIC BLOOD PRESSURE: 138 MMHG | RESPIRATION RATE: 16 BRPM | WEIGHT: 142 LBS | DIASTOLIC BLOOD PRESSURE: 65 MMHG

## 2024-01-17 DIAGNOSIS — D48.7 NEOPLASM OF UNCERTAIN BEHAVIOR OF FACE: ICD-10-CM

## 2024-01-17 DIAGNOSIS — Z85.9 HISTORY OF SQUAMOUS CELL CARCINOMA EXCISION: Primary | ICD-10-CM

## 2024-01-17 DIAGNOSIS — Z98.890 HISTORY OF SQUAMOUS CELL CARCINOMA EXCISION: Primary | ICD-10-CM

## 2024-01-17 NOTE — PROGRESS NOTES
YOB: 1945  Location: [facility]  Location Address: [address] [place] [address], [facility], [address] [place] [address]  Location Phone: [phone]    Chief Complaint   Patient presents with    Squamous Cell Carcinoma     forehead       History of Present Illness  Marilyn Landry is a 78 y.o. female.  Marilyn Landry is here for follow up of ENT complaints. The patient is s/p excision of squamous cell carcinoma of the right forehead on 2023. She has had a relatively normal postoperative course.   She is seen by dermatology every 3-4 months (Dr. Diane in [place], [place]).     Tissue Pathology Exam (2023 12:37)      Past Medical History:   Diagnosis Date    Disease of thyroid gland     GERD (gastroesophageal reflux disease)     High blood pressure     High cholesterol     Irritable bowel     Neuropathy of both feet     SCC (squamous cell carcinoma)        Past Surgical History:   Procedure Laterality Date    COLONOSCOPY      EXCISION LESION Right 2023    Procedure: EXCISION OF SQUAMOUS CELL CARCINOMA OF THE RIGHT FOREHEAD with FLAP;  Surgeon: Jeffrey Parks MD;  Location: [place] [facility];  Service: ENT;  Laterality: Right;    GALLBLADDER SURGERY      HYSTERECTOMY      REPLACEMENT TOTAL KNEE Left        Outpatient Medications Marked as Taking for the 24 encounter (Office Visit) with Rory Page APRN   Medication Sig Dispense Refill    amLODIPine (NORVASC) 10 MG tablet Take 1 tablet by mouth Daily.      atenolol (TENORMIN) 50 MG tablet Take 1 tablet by mouth Daily.      colestipol (COLESTID) 1 g tablet Take 1 tablet by mouth 2 (Two) Times a Day.      dicyclomine (BENTYL) 10 MG capsule Take 1 capsule by mouth 2 (Two) Times a Day As Needed (cramping).      estradiol (ESTRACE) 0.1 MG/GM vaginal cream Insert 2 applicators into the vagina As Needed.      gabapentin (NEURONTIN) 300 MG capsule Take 1 capsule by mouth Daily.      hydroCHLOROthiazide (HYDRODIURIL) 25 MG tablet Take 1 tablet by mouth Daily.       levothyroxine (SYNTHROID, LEVOTHROID) 112 MCG tablet Take 1 tablet by mouth Daily.      omeprazole (priLOSEC) 20 MG capsule Take 1 capsule by mouth Daily.      pravastatin (PRAVACHOL) 20 MG tablet Take 1 tablet by mouth Daily.         Penicillins    History reviewed. No pertinent family history.    Social History     Socioeconomic History    Marital status:    Tobacco Use    Smoking status: Never    Smokeless tobacco: Never   Vaping Use    Vaping Use: Never used   Substance and Sexual Activity    Alcohol use: Never    Drug use: Never    Sexual activity: Defer       Review of Systems   Constitutional: Negative.    HENT: Negative.     Skin: Negative.        Vitals:    01/17/24 1331   BP: 138/65   Pulse: 61   Resp: 16   Temp: 97.5 °F (36.4 °C)       Body mass index is 27.73 kg/m².    Objective     Physical Exam  Vitals reviewed.   Constitutional:       Appearance: Normal appearance.   HENT:      Head: Normocephalic.        Right Ear: Hearing and external ear normal.      Left Ear: Hearing and external ear normal.      Nose: Nose normal.      Mouth/Throat:      Lips: Pink.      Mouth: Mucous membranes are moist.   Musculoskeletal:      Cervical back: Full passive range of motion without pain.   Neurological:      Mental Status: She is alert.   Psychiatric:         Behavior: Behavior is cooperative.         Assessment & Plan   Diagnoses and all orders for this visit:    1. History of squamous cell carcinoma excision (Primary)    2. Neoplasm of uncertain behavior of face      * Surgery not found *  No orders of the defined types were placed in this encounter.    Continue follow up with dermatology as scheduled  Vitamin E/ scar cream to incision  Return for problems    Return in about 1 year (around 1/17/2025) for Recheck.       Patient Instructions   Continue follow up with dermatology as scheduled  Vitamin E/ scar cream to incision  Return for problems    CONTACT INFORMATION:  The main office phone number is  [phone]. For emergencies after hours and on weekends, this number will convert over to our answering service and the on call provider will answer. Please try to keep non emergent phone calls/ questions to office hours 9am-5pm Monday through Friday.      [employer]  As an alternative, you can sign up and use the Epic MyChart system for more direct and quicker access for non emergent questions/ problems.  [facility] [employer] allows you to send messages to your doctor, view your test results, renew your prescriptions, schedule appointments, and more. To sign up, go to [employer] and click on the Sign Up Now link in the New User? box. Enter your [employer] Activation Code exactly as it appears below along with the last four digits of your Social Security Number and your Date of Birth () to complete the sign-up process. If you do not sign up before the expiration date, you must request a new code.     [employer] Activation Code: Activation code not generated  Current [employer] Status: Active     If you have questions, you can email [email or web address] or call [phone] to talk to our [employer] staff. Remember, [employer] is NOT to be used for urgent needs. For medical emergencies, dial 911.     IF YOU SMOKE OR USE TOBACCO PLEASE READ THE FOLLOWING:  Why is smoking bad for me?  Smoking increases the risk of heart disease, lung disease, vascular disease, stroke, and cancer. If you smoke, STOP!        IF YOU SMOKE OR USE TOBACCO PLEASE READ THE FOLLOWING:  Why is smoking bad for me?  Smoking increases the risk of heart disease, lung disease, vascular disease, stroke, and cancer. If you smoke, STOP!     For more information:  Quit Now [place]  -QUIT-NOW  [email or web address]

## 2024-01-17 NOTE — PATIENT INSTRUCTIONS
Continue follow up with dermatology as scheduled  Vitamin E/ scar cream to incision  Return for problems    CONTACT INFORMATION:  The main office phone number is [phone]. For emergencies after hours and on weekends, this number will convert over to our answering service and the on call provider will answer. Please try to keep non emergent phone calls/ questions to office hours 9am-5pm Monday through Friday.      [employer]  As an alternative, you can sign up and use the Epic MyChart system for more direct and quicker access for non emergent questions/ problems.  [employer] allows you to send messages to your doctor, view your test results, renew your prescriptions, schedule appointments, and more. To sign up, go to [employer] and click on the Sign Up Now link in the New User? box. Enter your [employer] Activation Code exactly as it appears below along with the last four digits of your Social Security Number and your Date of Birth () to complete the sign-up process. If you do not sign up before the expiration date, you must request a new code.     [employer] Activation Code: Activation code not generated  Current [employer] Status: Active     If you have questions, you can email [email or web address] or call [phone] to talk to our [employer] staff. Remember, [employer] is NOT to be used for urgent needs. For medical emergencies, dial 911.     IF YOU SMOKE OR USE TOBACCO PLEASE READ THE FOLLOWING:  Why is smoking bad for me?  Smoking increases the risk of heart disease, lung disease, vascular disease, stroke, and cancer. If you smoke, STOP!        IF YOU SMOKE OR USE TOBACCO PLEASE READ THE FOLLOWING:  Why is smoking bad for me?  Smoking increases the risk of heart disease, lung disease, vascular disease, stroke, and cancer. If you smoke, STOP!     For more information:  [facility]  -QUIT-NOW  [email or web address]    JOSE ENRIQUE Huang Rheum Nurse Msg Pool             The request for Cyclophosphamide is approved through 12.31.19.   Antoinette

## 2024-03-26 ENCOUNTER — TELEPHONE (OUTPATIENT)
Dept: OTOLARYNGOLOGY | Facility: CLINIC | Age: 79
End: 2024-03-26

## 2024-03-26 NOTE — TELEPHONE ENCOUNTER
Caller: JEAN DIAZ    Relationship to patient: SELF    Best call back number: [phone]    Chief complaint: PATIENT HAD LESION ON LEFT JAW REMOVED CALLED TO MAKE AN APPOINTMENT WITH DR JANG. SHE IS SCHEDULED FOR FOLLOW UP ON MAY  20TH WITH TRI DAWSON - PATIENT UNCLEAR ON WHO SHE NEEDS TO SCHEDULE WITH.     PLEASE REACH OUT YOU MAY LEAVE A MESSAGE ON HOME NUMBER

## 2024-03-27 ENCOUNTER — TELEPHONE (OUTPATIENT)
Dept: OTOLARYNGOLOGY | Facility: CLINIC | Age: 79
End: 2024-03-27
Payer: MEDICARE

## 2024-03-27 NOTE — TELEPHONE ENCOUNTER
Spoke w/pt about her appointments. She does know about her appointment for Jan.2025 w/addie,but needs to schedule an appointment w/Dr Parks to remove a lesion on her face. I transferred her call to Talia's phone an ask pt to left her a message.

## 2024-05-02 ENCOUNTER — OFFICE VISIT (OUTPATIENT)
Dept: OTOLARYNGOLOGY | Facility: CLINIC | Age: 79
End: 2024-05-02
Payer: MEDICARE

## 2024-05-02 VITALS
BODY MASS INDEX: 27.17 KG/M2 | DIASTOLIC BLOOD PRESSURE: 85 MMHG | HEIGHT: 60 IN | TEMPERATURE: 97.1 F | SYSTOLIC BLOOD PRESSURE: 178 MMHG | WEIGHT: 138.38 LBS | HEART RATE: 59 BPM

## 2024-05-02 DIAGNOSIS — Z98.890 HISTORY OF SQUAMOUS CELL CARCINOMA EXCISION: ICD-10-CM

## 2024-05-02 DIAGNOSIS — D48.5 BASALOID NEOPLASM OF UNCERTAIN BEHAVIOR OF SKIN: Primary | ICD-10-CM

## 2024-05-02 DIAGNOSIS — Z85.9 HISTORY OF SQUAMOUS CELL CARCINOMA EXCISION: ICD-10-CM

## 2024-05-02 DIAGNOSIS — D48.7 NEOPLASM OF UNCERTAIN BEHAVIOR OF FACE: ICD-10-CM

## 2024-05-02 NOTE — PROGRESS NOTES
Name:  Marilyn Landry  YOB: 1945  Location: [facility]  Location Address: [address] [place] [address], [facility], [address] [place] [address]  Location Phone: [phone]    Chief Complaint  Chief Complaint   Patient presents with   • Skin lesion on mandible       History of Present Illness  The patient  is a 78 y.o. female who is referred by  [facility]  for preoperative evaluation. She complains of a lesionleft mandible present for 1 year(s).   It has been  biopsied.  Pathology demonstrated a  basaloid malignancy.    Reviewed:     Past Medical History:   Diagnosis Date   • Disease of thyroid gland    • GERD (gastroesophageal reflux disease)    • High blood pressure    • High cholesterol    • Irritable bowel    • Neuropathy of both feet    • SCC (squamous cell carcinoma)        Past Surgical History:   Procedure Laterality Date   • COLONOSCOPY     • EXCISION LESION Right 2023    Procedure: EXCISION OF SQUAMOUS CELL CARCINOMA OF THE RIGHT FOREHEAD with FLAP;  Surgeon: Jeffrey Parks MD;  Location: [facility];  Service: ENT;  Laterality: Right;   • GALLBLADDER SURGERY     • HYSTERECTOMY     • REPLACEMENT TOTAL KNEE Left          Current Outpatient Medications:   •  amLODIPine (NORVASC) 10 MG tablet, Take 1 tablet by mouth Daily., Disp: , Rfl:   •  atenolol (TENORMIN) 50 MG tablet, Take 1 tablet by mouth Daily., Disp: , Rfl:   •  colestipol (COLESTID) 1 g tablet, Take 1 tablet by mouth 2 (Two) Times a Day., Disp: , Rfl:   •  dicyclomine (BENTYL) 10 MG capsule, Take 1 capsule by mouth 2 (Two) Times a Day As Needed (cramping)., Disp: , Rfl:   •  estradiol (ESTRACE) 0.1 MG/GM vaginal cream, Insert 2 applicators into the vagina As Needed., Disp: , Rfl:   •  gabapentin (NEURONTIN) 300 MG capsule, Take 1 capsule by mouth Daily., Disp: , Rfl:   •  hydroCHLOROthiazide (HYDRODIURIL) 25 MG tablet, Take 1 tablet by mouth Daily., Disp: , Rfl:   •  HYDROcodone-acetaminophen (NORCO) 5-325 MG per  tablet, Take 1 tablet by mouth Every 8 (Eight) Hours As Needed for Moderate Pain or Severe Pain (Pain) for up to 6 doses. (Patient not taking: Reported on 1/17/2024), Disp: 6 tablet, Rfl: 0  •  levothyroxine (SYNTHROID, LEVOTHROID) 112 MCG tablet, Take 1 tablet by mouth Daily., Disp: , Rfl:   •  omeprazole (priLOSEC) 20 MG capsule, Take 1 capsule by mouth Daily., Disp: , Rfl:   •  pravastatin (PRAVACHOL) 20 MG tablet, Take 1 tablet by mouth Daily., Disp: , Rfl:     Penicillins and Sulfa antibiotics    History reviewed. No pertinent family history.    Social History     Socioeconomic History   • Marital status:    Tobacco Use   • Smoking status: Never   • Smokeless tobacco: Never   Vaping Use   • Vaping status: Never Used   Substance and Sexual Activity   • Alcohol use: Never   • Drug use: Never   • Sexual activity: Defer       Review of Systems   Skin:         Admits skin lesion to left cheek       Vitals:    05/02/24 0931   BP: 178/85   Pulse: 59   Temp: 97.1 °F (36.2 °C)       Objective     Physical Exam  Vitals reviewed.   Constitutional:       Appearance: Normal appearance.   HENT:      Head: Normocephalic.        Right Ear: External ear normal.      Left Ear: External ear normal.      Nose: Nose normal.      Mouth/Throat:      Lips: Pink.      Mouth: Mucous membranes are moist.   Musculoskeletal:      Cervical back: Full passive range of motion without pain.   Neurological:      Mental Status: She is alert.   Psychiatric:         Behavior: Behavior is cooperative.       Assessment & Plan   Diagnoses and all orders for this visit:    1. Basaloid neoplasm of uncertain behavior of skin (Primary)  -     Case Request; Standing  -     Comprehensive Metabolic Panel; Future  -     CBC and Differential; Future  -     ECG 12 Lead; Future  -     XR Chest 2 View; Future  -     Case Request    2. Neoplasm of uncertain behavior of face  -     Case Request; Standing  -     Comprehensive Metabolic Panel; Future  -      CBC and Differential; Future  -     ECG 12 Lead; Future  -     XR Chest 2 View; Future  -     Case Request    3. History of squamous cell carcinoma excision  -     Case Request; Standing  -     Comprehensive Metabolic Panel; Future  -     CBC and Differential; Future  -     ECG 12 Lead; Future  -     XR Chest 2 View; Future  -     Case Request    Other orders  -     Follow Anesthesia Guidelines / Protocol; Future  -     Obtain Informed Consent  -     Follow Anesthesia Guidelines / Protocol; Standing  -     Verify NPO Status; Standing  -     Obtain Informed Consent; Standing  -     SCD (Sequential Compression Device) - To Be Placed on Patient in Pre-Op; Standing      EXCISION LESION OF BASALOID OF THE LEFT MANDIBLE WITH FROZEN SECTION AND POSSIBLE FLAP OR GRAFT (Left)  Orders Placed This Encounter   Procedures   • XR Chest 2 View     Standing Status:   Future     Standing Expiration Date:   5/2/2025     Scheduling Instructions:      BRANDI THIBODEAUX     Order Specific Question:   Reason for Exam:     Answer:   PRE OP     Order Specific Question:   Release to patient     Answer:   Routine Release [[id number]]   • Comprehensive Metabolic Panel     BRANDI     Standing Status:   Future     Standing Expiration Date:   5/2/2025     Order Specific Question:   Release to patient     Answer:   Routine Release [[id number]]   • Obtain Informed Consent     EXCISION LESION with possible flap or graft-     Order Specific Question:   Informed Consent Given For     Answer:   EXCISION LESION OF BASALOID OF THE LEFT MANDIBLE WITH FROZEN SECTION AND POSSIBLE FLAP OR GRAFT   • ECG 12 Lead     Standing Status:   Future     Standing Expiration Date:   5/2/2025     Scheduling Instructions:      BRANDI THIBODEAUX     Order Specific Question:   Reason for Exam:     Answer:   EXCISION LESION     Order Specific Question:   Release to patient     Answer:   Routine Release [[id number]]   • CBC and Differential     BRANDI     Standing Status:    "Future     Standing Expiration Date:   5/2/2025     Order Specific Question:   Manual Differential     Answer:   No     Order Specific Question:   Release to patient     Answer:   Routine Release [[id number]]     The risks, benefits and options of the procedure were explained to the patient. The possiblity of a persistent cosmetic defect as well as a \"flap\" or a graft to close the defect was discussed. The patient was instructed to stop all aspirin, NSAIDs and VIT E etc.  If appropriate, clearance with primary MD or specialist will be obtained preoperatively.  The patient was scheduled for a LOCAL in the office.    For instructions on the proper use, care and disposal of controled substances, ask you doctor or refer to the [place] Board of Medicine website: http://kbml.[place].gov/hb1/Pages/Considerations-For-Patient-Education.aspx     Return for post op.       Patient Instructions   The risks, benefits and options of the procedure were explained to the patient. The possiblity of a persistent cosmetic defect as well as a \"flap\" or a graft to close the defect was discussed. The patient was instructed to stop all aspirin, NSAIDs and VIT E etc.  If appropriate, clearance with primary MD or specialist will be obtained preoperatively.  The patient was scheduled for a LOCAL in the office.    For instructions on the proper use, care and disposal of controled substances, ask you doctor or refer to the [place] Board of Medicine website: http://kbml.ky.gov/hb1/Pages/Considerations-For-Patient-Education.aspx           "

## 2024-05-03 PROBLEM — D48.5 BASALOID NEOPLASM OF UNCERTAIN BEHAVIOR OF SKIN: Status: ACTIVE | Noted: 2024-05-02

## 2024-05-03 PROBLEM — Z85.9 HISTORY OF SQUAMOUS CELL CARCINOMA EXCISION: Status: ACTIVE | Noted: 2024-05-02

## 2024-05-03 PROBLEM — D48.7 NEOPLASM OF UNCERTAIN BEHAVIOR OF FACE: Status: ACTIVE | Noted: 2024-05-02

## 2024-05-03 PROBLEM — Z98.890 HISTORY OF SQUAMOUS CELL CARCINOMA EXCISION: Status: ACTIVE | Noted: 2024-05-02

## 2024-05-31 ENCOUNTER — PRE-ADMISSION TESTING (OUTPATIENT)
Dept: PREADMISSION TESTING | Facility: HOSPITAL | Age: 79
End: 2024-05-31
Payer: MEDICARE

## 2024-05-31 VITALS
RESPIRATION RATE: 18 BRPM | OXYGEN SATURATION: 99 % | HEIGHT: 61 IN | BODY MASS INDEX: 26.47 KG/M2 | WEIGHT: 140.21 LBS | SYSTOLIC BLOOD PRESSURE: 150 MMHG | DIASTOLIC BLOOD PRESSURE: 51 MMHG | HEART RATE: 63 BPM

## 2024-05-31 LAB
ANION GAP SERPL CALCULATED.3IONS-SCNC: 13 MMOL/L (ref 5–15)
BUN SERPL-MCNC: 14 MG/DL (ref 8–23)
BUN/CREAT SERPL: 15.9 (ref 7–25)
CALCIUM SPEC-SCNC: 9.2 MG/DL (ref 8.6–10.5)
CHLORIDE SERPL-SCNC: 100 MMOL/L (ref 98–107)
CO2 SERPL-SCNC: 26 MMOL/L (ref 22–29)
CREAT SERPL-MCNC: 0.88 MG/DL (ref 0.57–1)
DEPRECATED RDW RBC AUTO: 45.6 FL (ref 37–54)
EGFRCR SERPLBLD CKD-EPI 2021: 66.9 ML/MIN/1.73
ERYTHROCYTE [DISTWIDTH] IN BLOOD BY AUTOMATED COUNT: 14.1 % (ref 12.3–15.4)
GLUCOSE SERPL-MCNC: 116 MG/DL (ref 65–99)
HCT VFR BLD AUTO: 39.1 % (ref 34–46.6)
HGB BLD-MCNC: 12.6 G/DL (ref 12–15.9)
MCH RBC QN AUTO: 28.4 PG (ref 26.6–33)
MCHC RBC AUTO-ENTMCNC: 32.2 G/DL (ref 31.5–35.7)
MCV RBC AUTO: 88.1 FL (ref 79–97)
PLATELET # BLD AUTO: 274 10*3/MM3 (ref 140–450)
PMV BLD AUTO: 10.7 FL (ref 6–12)
POTASSIUM SERPL-SCNC: 3.5 MMOL/L (ref 3.5–5.2)
RBC # BLD AUTO: 4.44 10*6/MM3 (ref 3.77–5.28)
SODIUM SERPL-SCNC: 139 MMOL/L (ref 136–145)
WBC NRBC COR # BLD AUTO: 11 10*3/MM3 (ref 3.4–10.8)

## 2024-05-31 PROCEDURE — 80048 BASIC METABOLIC PNL TOTAL CA: CPT

## 2024-05-31 PROCEDURE — 36415 COLL VENOUS BLD VENIPUNCTURE: CPT

## 2024-05-31 PROCEDURE — 85027 COMPLETE CBC AUTOMATED: CPT

## 2024-05-31 NOTE — DISCHARGE INSTRUCTIONS

## 2024-06-07 ENCOUNTER — ANESTHESIA EVENT (OUTPATIENT)
Dept: PERIOP | Facility: HOSPITAL | Age: 79
End: 2024-06-07
Payer: MEDICARE

## 2024-06-07 ENCOUNTER — ANESTHESIA (OUTPATIENT)
Dept: PERIOP | Facility: HOSPITAL | Age: 79
End: 2024-06-07
Payer: MEDICARE

## 2024-06-07 ENCOUNTER — HOSPITAL ENCOUNTER (OUTPATIENT)
Facility: HOSPITAL | Age: 79
Setting detail: HOSPITAL OUTPATIENT SURGERY
Discharge: HOME | End: 2024-06-07
Attending: OTOLARYNGOLOGY | Admitting: OTOLARYNGOLOGY
Payer: MEDICARE

## 2024-06-07 VITALS
SYSTOLIC BLOOD PRESSURE: 118 MMHG | RESPIRATION RATE: 17 BRPM | OXYGEN SATURATION: 95 % | TEMPERATURE: 96.5 F | DIASTOLIC BLOOD PRESSURE: 43 MMHG | HEART RATE: 60 BPM

## 2024-06-07 DIAGNOSIS — Z85.9 HISTORY OF SQUAMOUS CELL CARCINOMA EXCISION: ICD-10-CM

## 2024-06-07 DIAGNOSIS — D48.7 NEOPLASM OF UNCERTAIN BEHAVIOR OF FACE: Primary | ICD-10-CM

## 2024-06-07 DIAGNOSIS — D48.5 BASALOID NEOPLASM OF UNCERTAIN BEHAVIOR OF SKIN: ICD-10-CM

## 2024-06-07 DIAGNOSIS — Z98.890 HISTORY OF SQUAMOUS CELL CARCINOMA EXCISION: ICD-10-CM

## 2024-06-07 PROCEDURE — 11642 EXC F/E/E/N/L MAL+MRG 1.1-2: CPT | Performed by: OTOLARYNGOLOGY

## 2024-06-07 PROCEDURE — 88305 TISSUE EXAM BY PATHOLOGIST: CPT | Performed by: OTOLARYNGOLOGY

## 2024-06-07 PROCEDURE — 25010000002 FENTANYL CITRATE (PF) 100 MCG/2ML SOLUTION: Performed by: NURSE ANESTHETIST, CERTIFIED REGISTERED

## 2024-06-07 PROCEDURE — 25810000003 LACTATED RINGERS PER 1000 ML: Performed by: OTOLARYNGOLOGY

## 2024-06-07 PROCEDURE — 88313 SPECIAL STAINS GROUP 2: CPT | Performed by: OTOLARYNGOLOGY

## 2024-06-07 PROCEDURE — 25010000002 PROPOFOL 200 MG/20ML EMULSION: Performed by: NURSE ANESTHETIST, CERTIFIED REGISTERED

## 2024-06-07 PROCEDURE — 11104 PUNCH BX SKIN SINGLE LESION: CPT | Performed by: OTOLARYNGOLOGY

## 2024-06-07 PROCEDURE — 13132 CMPLX RPR F/C/C/M/N/AX/G/H/F: CPT | Performed by: OTOLARYNGOLOGY

## 2024-06-07 RX ORDER — ONDANSETRON 4 MG/1
4 TABLET, ORALLY DISINTEGRATING ORAL ONCE AS NEEDED
Status: DISCONTINUED | OUTPATIENT
Start: 2024-06-07 | End: 2024-06-07 | Stop reason: HOSPADM

## 2024-06-07 RX ORDER — FLUMAZENIL 0.1 MG/ML
0.2 INJECTION INTRAVENOUS AS NEEDED
Status: DISCONTINUED | OUTPATIENT
Start: 2024-06-07 | End: 2024-06-07 | Stop reason: HOSPADM

## 2024-06-07 RX ORDER — LIDOCAINE HYDROCHLORIDE AND EPINEPHRINE 10; 10 MG/ML; UG/ML
INJECTION, SOLUTION INFILTRATION; PERINEURAL AS NEEDED
Status: DISCONTINUED | OUTPATIENT
Start: 2024-06-07 | End: 2024-06-07 | Stop reason: HOSPADM

## 2024-06-07 RX ORDER — NALOXONE HCL 0.4 MG/ML
0.4 VIAL (ML) INJECTION AS NEEDED
Status: DISCONTINUED | OUTPATIENT
Start: 2024-06-07 | End: 2024-06-07 | Stop reason: HOSPADM

## 2024-06-07 RX ORDER — FENTANYL CITRATE 50 UG/ML
INJECTION, SOLUTION INTRAMUSCULAR; INTRAVENOUS AS NEEDED
Status: DISCONTINUED | OUTPATIENT
Start: 2024-06-07 | End: 2024-06-07 | Stop reason: SURG

## 2024-06-07 RX ORDER — PROPOFOL 10 MG/ML
INJECTION, EMULSION INTRAVENOUS AS NEEDED
Status: DISCONTINUED | OUTPATIENT
Start: 2024-06-07 | End: 2024-06-07 | Stop reason: SURG

## 2024-06-07 RX ORDER — DROPERIDOL 2.5 MG/ML
0.62 INJECTION, SOLUTION INTRAMUSCULAR; INTRAVENOUS ONCE AS NEEDED
Status: DISCONTINUED | OUTPATIENT
Start: 2024-06-07 | End: 2024-06-07 | Stop reason: HOSPADM

## 2024-06-07 RX ORDER — IBUPROFEN 600 MG/1
600 TABLET ORAL EVERY 6 HOURS PRN
Status: DISCONTINUED | OUTPATIENT
Start: 2024-06-07 | End: 2024-06-07 | Stop reason: HOSPADM

## 2024-06-07 RX ORDER — SODIUM CHLORIDE, SODIUM LACTATE, POTASSIUM CHLORIDE, CALCIUM CHLORIDE 600; 310; 30; 20 MG/100ML; MG/100ML; MG/100ML; MG/100ML
1000 INJECTION, SOLUTION INTRAVENOUS CONTINUOUS
Status: DISCONTINUED | OUTPATIENT
Start: 2024-06-07 | End: 2024-06-07 | Stop reason: HOSPADM

## 2024-06-07 RX ORDER — LABETALOL HYDROCHLORIDE 5 MG/ML
5 INJECTION, SOLUTION INTRAVENOUS
Status: DISCONTINUED | OUTPATIENT
Start: 2024-06-07 | End: 2024-06-07 | Stop reason: HOSPADM

## 2024-06-07 RX ORDER — MAGNESIUM HYDROXIDE 1200 MG/15ML
LIQUID ORAL AS NEEDED
Status: DISCONTINUED | OUTPATIENT
Start: 2024-06-07 | End: 2024-06-07 | Stop reason: HOSPADM

## 2024-06-07 RX ORDER — HYDROCODONE BITARTRATE AND ACETAMINOPHEN 5; 325 MG/1; MG/1
1 TABLET ORAL EVERY 4 HOURS PRN
Status: DISCONTINUED | OUTPATIENT
Start: 2024-06-07 | End: 2024-06-07 | Stop reason: HOSPADM

## 2024-06-07 RX ORDER — ONDANSETRON 2 MG/ML
4 INJECTION INTRAMUSCULAR; INTRAVENOUS ONCE AS NEEDED
Status: DISCONTINUED | OUTPATIENT
Start: 2024-06-07 | End: 2024-06-07 | Stop reason: HOSPADM

## 2024-06-07 RX ORDER — LIDOCAINE HYDROCHLORIDE 10 MG/ML
0.5 INJECTION, SOLUTION EPIDURAL; INFILTRATION; INTRACAUDAL; PERINEURAL ONCE AS NEEDED
Status: DISCONTINUED | OUTPATIENT
Start: 2024-06-07 | End: 2024-06-07 | Stop reason: HOSPADM

## 2024-06-07 RX ORDER — LIDOCAINE HYDROCHLORIDE 20 MG/ML
INJECTION, SOLUTION EPIDURAL; INFILTRATION; INTRACAUDAL; PERINEURAL AS NEEDED
Status: DISCONTINUED | OUTPATIENT
Start: 2024-06-07 | End: 2024-06-07 | Stop reason: SURG

## 2024-06-07 RX ORDER — SODIUM CHLORIDE 0.9 % (FLUSH) 0.9 %
3 SYRINGE (ML) INJECTION AS NEEDED
Status: DISCONTINUED | OUTPATIENT
Start: 2024-06-07 | End: 2024-06-07 | Stop reason: HOSPADM

## 2024-06-07 RX ORDER — HYDROCODONE BITARTRATE AND ACETAMINOPHEN 5; 325 MG/1; MG/1
1 TABLET ORAL ONCE AS NEEDED
Status: DISCONTINUED | OUTPATIENT
Start: 2024-06-07 | End: 2024-06-07 | Stop reason: HOSPADM

## 2024-06-07 RX ORDER — FENTANYL CITRATE 50 UG/ML
50 INJECTION, SOLUTION INTRAMUSCULAR; INTRAVENOUS
Status: DISCONTINUED | OUTPATIENT
Start: 2024-06-07 | End: 2024-06-07 | Stop reason: HOSPADM

## 2024-06-07 RX ORDER — HYDROCODONE BITARTRATE AND ACETAMINOPHEN 5; 325 MG/1; MG/1
1 TABLET ORAL EVERY 8 HOURS PRN
Qty: 8 TABLET | Refills: 0 | Status: SHIPPED | OUTPATIENT
Start: 2024-06-07

## 2024-06-07 RX ORDER — HYDROCODONE BITARTRATE AND ACETAMINOPHEN 10; 325 MG/1; MG/1
1 TABLET ORAL EVERY 4 HOURS PRN
Status: DISCONTINUED | OUTPATIENT
Start: 2024-06-07 | End: 2024-06-07 | Stop reason: HOSPADM

## 2024-06-07 RX ADMIN — LIDOCAINE HYDROCHLORIDE 60 MG: 20 INJECTION, SOLUTION EPIDURAL; INFILTRATION; INTRACAUDAL; PERINEURAL at 09:34

## 2024-06-07 RX ADMIN — PROPOFOL 30 MG: 10 INJECTION, EMULSION INTRAVENOUS at 09:34

## 2024-06-07 RX ADMIN — FENTANYL CITRATE 25 MCG: 50 INJECTION, SOLUTION INTRAMUSCULAR; INTRAVENOUS at 09:34

## 2024-06-07 RX ADMIN — PROPOFOL 25 MCG/KG/MIN: 10 INJECTION, EMULSION INTRAVENOUS at 09:39

## 2024-06-07 RX ADMIN — SODIUM CHLORIDE, POTASSIUM CHLORIDE, SODIUM LACTATE AND CALCIUM CHLORIDE 1000 ML: 600; 310; 30; 20 INJECTION, SOLUTION INTRAVENOUS at 08:15

## 2024-06-07 NOTE — OP NOTE
OPERATIVE NOTE  6/7/2024    NAME: Marilyn Landry    YOB: 1945  MRN: [id number]    PRE-OPERATIVE DIAGNOSIS:    Basaloid neoplasm of uncertain behavior of skin [D48.5]  Neoplasm of uncertain behavior of face [D48.7]  History of squamous cell carcinoma excision [Z98.890, Z85.9]    POST-OPERATIVE DIAGNOSIS:   Post-Op Diagnosis Codes:     * Basaloid neoplasm of uncertain behavior of skin [D48.5]     * Neoplasm of uncertain behavior of face [D48.7]     * History of squamous cell carcinoma excision [Z98.890, Z85.9]    PROCEDURE PERFORMED:   Excision of basaloid neoplasm of the left jawline with complex closure  3 mm punch biopsy left inferior jawline    SURGEON:   Jeffrey Parks MD    ASSISTANT(S):   None    ANESTHESIA:   MAC and Local Anesthesia with 1% Xylocaine with Epinephrine 1:100,000    INDICATIONS: The patient is a 78 y.o. female with Basaloid neoplasm of uncertain behavior of skin [D48.5]  Neoplasm of uncertain behavior of face [D48.7]  History of squamous cell carcinoma excision [Z98.890, Z85.9]    PROCEDURE:  The patient was brought to the operating room, given MAC and Local Anesthesia with 1% Xylocaine with Epinephrine 1:100,000, and prepped and draped in the usual manner.     Approximately 8mL 1% Xylocaine with epinephrine was injected in the planned excision site in the left jawline.  Excision was accomplished with a #15 blade in an elliptical fashion without difficulty.  The excision was approximately 3.0 cm  x 1.2 cm.  The lesion was approximately 1.4 cm x 1.0 cm.  The margin was 1 mm. The excision extended to subcutaneous tissue.    Upon excision the specimen was marked and submitted for permanent pathologic examination.    3 mm punch biopsy was also performed of the subadjacent papular lesion near the anterior aspect of the excision.  This was sutured with interrupted 5-0 nylon.  The specimen was submitted separately for permanent pathologic examination.    Extensive and wide  undermining was performed of the formal excision with curved iris scissors and double prong skin hooks.  This was performed in order to facilitate closure and to preserve normal anatomic relationships.  Minimal bleeding was encountered which was controlled with electrocautery on low settings.  The incision was reapproximated utilizing interrupted 4-0 Monocryl subcutaneously and interrupted 5-0 nylon to reapproximate the epidermis.  Bactroban ointment was applied and the procedure terminated.     The patient was transported upon completion of the procedure to the postop care in stable condition.      SPECIMENS:  Specimens       ID Source Type Tests Collected By Collected At Frozen?    A Mandible Tissue TISSUE PATHOLOGY EXAM   Jeffrey Parks MD 6/7/24 9733 No    Description: SUPERIOR SHORT STITCH, LEFT LATERAL LONG STITCH    B Mandible Tissue TISSUE PATHOLOGY EXAM   Jeffrey Parks MD 6/7/24 0150 No    Description: 3mm punch biopsy of left jaw              COMPLICATIONS: NONE    ESTIMATED BLOOD LOSS:  Minimal    Jeffrey Parks MD  6/7/2024

## 2024-06-07 NOTE — H&P
Name:  Marilyn Landry  YOB: 1945  Location: [facility]  Location Address: [address] [place] [address], [facility], [address] [place] [address]  Location Phone: [phone]     Chief Complaint      Chief Complaint   Patient presents with    Skin lesion on mandible         History of Present Illness  The patient  is a 78 y.o. female who is referred by  [facility]  for preoperative evaluation. She complains of a lesionleft mandible present for 1 year(s).   It has been  biopsied.  Pathology demonstrated a  basaloid malignancy.     Reviewed:     Medical History        Past Medical History:   Diagnosis Date    Disease of thyroid gland      GERD (gastroesophageal reflux disease)      High blood pressure      High cholesterol      Irritable bowel      Neuropathy of both feet      SCC (squamous cell carcinoma)              Surgical History         Past Surgical History:   Procedure Laterality Date    COLONOSCOPY        EXCISION LESION Right 2023     Procedure: EXCISION OF SQUAMOUS CELL CARCINOMA OF THE RIGHT FOREHEAD with FLAP;  Surgeon: Jeffrey Parks MD;  Location: [facility];  Service: ENT;  Laterality: Right;    GALLBLADDER SURGERY        HYSTERECTOMY        REPLACEMENT TOTAL KNEE Left                Current Medications      Current Outpatient Medications:     amLODIPine (NORVASC) 10 MG tablet, Take 1 tablet by mouth Daily., Disp: , Rfl:     atenolol (TENORMIN) 50 MG tablet, Take 1 tablet by mouth Daily., Disp: , Rfl:     colestipol (COLESTID) 1 g tablet, Take 1 tablet by mouth 2 (Two) Times a Day., Disp: , Rfl:     dicyclomine (BENTYL) 10 MG capsule, Take 1 capsule by mouth 2 (Two) Times a Day As Needed (cramping)., Disp: , Rfl:     estradiol (ESTRACE) 0.1 MG/GM vaginal cream, Insert 2 applicators into the vagina As Needed., Disp: , Rfl:     gabapentin (NEURONTIN) 300 MG capsule, Take 1 capsule by mouth Daily., Disp: , Rfl:     hydroCHLOROthiazide (HYDRODIURIL) 25 MG tablet, Take 1 tablet by  mouth Daily., Disp: , Rfl:     HYDROcodone-acetaminophen (NORCO) 5-325 MG per tablet, Take 1 tablet by mouth Every 8 (Eight) Hours As Needed for Moderate Pain or Severe Pain (Pain) for up to 6 doses. (Patient not taking: Reported on 1/17/2024), Disp: 6 tablet, Rfl: 0    levothyroxine (SYNTHROID, LEVOTHROID) 112 MCG tablet, Take 1 tablet by mouth Daily., Disp: , Rfl:     omeprazole (priLOSEC) 20 MG capsule, Take 1 capsule by mouth Daily., Disp: , Rfl:     pravastatin (PRAVACHOL) 20 MG tablet, Take 1 tablet by mouth Daily., Disp: , Rfl:         Penicillins and Sulfa antibiotics     History reviewed. No pertinent family history.     Social History   Social History           Socioeconomic History    Marital status:    Tobacco Use    Smoking status: Never    Smokeless tobacco: Never   Vaping Use    Vaping status: Never Used   Substance and Sexual Activity    Alcohol use: Never    Drug use: Never    Sexual activity: Defer            Review of Systems   Skin:         Admits skin lesion to left cheek             Vitals:     05/02/24 0931   BP: 178/85   Pulse: 59   Temp: 97.1 °F (36.2 °C)            Objective  Physical Exam  Vitals reviewed.   Constitutional:       Appearance: Normal appearance.   HENT:      Head: Normocephalic.        Right Ear: External ear normal.      Left Ear: External ear normal.      Nose: Nose normal.      Mouth/Throat:      Lips: Pink.      Mouth: Mucous membranes are moist.   Musculoskeletal:      Cervical back: Full passive range of motion without pain.   Neurological:      Mental Status: She is alert.   Psychiatric:         Behavior: Behavior is cooperative.               Assessment & Plan  Diagnoses and all orders for this visit:     1. Basaloid neoplasm of uncertain behavior of skin (Primary)  -     Case Request; Standing  -     Comprehensive Metabolic Panel; Future  -     CBC and Differential; Future  -     ECG 12 Lead; Future  -     XR Chest 2 View; Future  -     Case Request     2.  Neoplasm of uncertain behavior of face  -     Case Request; Standing  -     Comprehensive Metabolic Panel; Future  -     CBC and Differential; Future  -     ECG 12 Lead; Future  -     XR Chest 2 View; Future  -     Case Request     3. History of squamous cell carcinoma excision  -     Case Request; Standing  -     Comprehensive Metabolic Panel; Future  -     CBC and Differential; Future  -     ECG 12 Lead; Future  -     XR Chest 2 View; Future  -     Case Request     Other orders  -     Follow Anesthesia Guidelines / Protocol; Future  -     Obtain Informed Consent  -     Follow Anesthesia Guidelines / Protocol; Standing  -     Verify NPO Status; Standing  -     Obtain Informed Consent; Standing  -     SCD (Sequential Compression Device) - To Be Placed on Patient in Pre-Op; Standing        EXCISION LESION OF BASALOID OF THE LEFT MANDIBLE WITH FROZEN SECTION AND POSSIBLE FLAP OR GRAFT (Left)         Orders Placed This Encounter   Procedures    XR Chest 2 View       Standing Status:   Future       Standing Expiration Date:   5/2/2025       Scheduling Instructions:         BRANDI THIBODEAUX       Order Specific Question:   Reason for Exam:       Answer:   PRE OP       Order Specific Question:   Release to patient       Answer:   Routine Release [[id number]]    Comprehensive Metabolic Panel       PAZ       Standing Status:   Future       Standing Expiration Date:   5/2/2025       Order Specific Question:   Release to patient       Answer:   Routine Release [[id number]]    Obtain Informed Consent       EXCISION LESION with possible flap or graft-       Order Specific Question:   Informed Consent Given For       Answer:   EXCISION LESION OF BASALOID OF THE LEFT MANDIBLE WITH FROZEN SECTION AND POSSIBLE FLAP OR GRAFT    ECG 12 Lead       Standing Status:   Future       Standing Expiration Date:   5/2/2025       Scheduling Instructions:         BRANDI THIBODEAUX       Order Specific Question:   Reason for Exam:       Answer:   " EXCISION LESION       Order Specific Question:   Release to patient       Answer:   Routine Release [[id number]]    CBC and Differential       PAZ       Standing Status:   Future       Standing Expiration Date:   5/2/2025       Order Specific Question:   Manual Differential       Answer:   No       Order Specific Question:   Release to patient       Answer:   Routine Release [[id number]]      The risks, benefits and options of the procedure were explained to the patient. The possiblity of a persistent cosmetic defect as well as a \"flap\" or a graft to close the defect was discussed. The patient was instructed to stop all aspirin, NSAIDs and VIT E etc.  If appropriate, clearance with primary MD or specialist will be obtained preoperatively.  The patient was scheduled for a LOCAL in the office.     For instructions on the proper use, care and disposal of controled substances, ask you doctor or refer to the [place] Board of Medicine website: http://kbml.[place].gov/hb1/Pages/Considerations-For-Patient-Education.aspx      Return for post op.           Patient Instructions   The risks, benefits and options of the procedure were explained to the patient. The possiblity of a persistent cosmetic defect as well as a \"flap\" or a graft to close the defect was discussed. The patient was instructed to stop all aspirin, NSAIDs and VIT E etc.  If appropriate, clearance with primary MD or specialist will be obtained preoperatively.  The patient was scheduled for a LOCAL in the office.     For instructions on the proper use, care and disposal of controled substances, ask you doctor or refer to the [place] Board of Medicine website: http://kbml.[place].gov/hb1/Pages/Considerations-For-Patient-Education.aspx      "

## 2024-06-07 NOTE — ANESTHESIA PREPROCEDURE EVALUATION
Anesthesia Evaluation     Patient summary reviewed   no history of anesthetic complications:   NPO Solid Status: > 8 hours  NPO Liquid Status: > 8 hours           Airway   Mallampati: III  TM distance: >3 FB  Neck ROM: full  Possible difficult intubation and Small opening  Dental    (+) partials    Pulmonary    Cardiovascular   Exercise tolerance: good (4-7 METS)    Patient on routine beta blocker    (+) hypertension, hyperlipidemia  (-) CAD      Neuro/Psych  (-) seizures, TIA, CVA  GI/Hepatic/Renal/Endo    (+) GERD, thyroid problem hypothyroidism  (-) liver disease, no renal disease, diabetes    Musculoskeletal     Abdominal    Substance History      OB/GYN          Other                          Anesthesia Plan    ASA 3     MAC     intravenous induction     Anesthetic plan, risks, benefits, and alternatives have been provided, discussed and informed consent has been obtained with: patient.        CODE STATUS:

## 2024-06-07 NOTE — ANESTHESIA POSTPROCEDURE EVALUATION
Patient: Marilyn Landry    Procedure Summary       Date: 06/07/24 Room / Location:  [facility]  /  [facility]    Anesthesia Start: 0929 Anesthesia Stop: 1006    Procedure: Excision of basaloid neoplasm of the left jawline with complex closure 3 mm punch biopsy left inferior jawline (Left) Diagnosis:       Basaloid neoplasm of uncertain behavior of skin      Neoplasm of uncertain behavior of face      History of squamous cell carcinoma excision      (Basaloid neoplasm of uncertain behavior of skin [D48.5])      (Neoplasm of uncertain behavior of face [D48.7])      (History of squamous cell carcinoma excision [Z98.890, Z85.9])    Surgeons: Jeffrey Parks MD Provider: Zenia Gottlieb CRNA    Anesthesia Type: MAC ASA Status: 3            Anesthesia Type: MAC    Vitals  Vitals Value Taken Time   BP     Temp     Pulse 57 06/07/24 0929   Resp     SpO2 96 % 06/07/24 0929   Vitals shown include unfiled device data.        Post Anesthesia Care and Evaluation    Patient location during evaluation: PHASE II  Patient participation: complete - patient participated  Level of consciousness: awake and alert  Pain score: 0  Pain management: adequate    Airway patency: patent  Anesthetic complications: No anesthetic complications  PONV Status: none  Cardiovascular status: acceptable  Respiratory status: acceptable  Hydration status: acceptable

## 2024-06-13 LAB
CYTO UR: NORMAL
LAB AP CASE REPORT: NORMAL
LAB AP DIAGNOSIS COMMENT: NORMAL
LAB AP INTRADEPARTMENTAL CONSULT: NORMAL
LAB AP SPECIAL STAINS: NORMAL
Lab: NORMAL
PATH REPORT.FINAL DX SPEC: NORMAL
PATH REPORT.GROSS SPEC: NORMAL

## 2024-06-17 ENCOUNTER — OFFICE VISIT (OUTPATIENT)
Dept: OTOLARYNGOLOGY | Facility: CLINIC | Age: 79
End: 2024-06-17
Payer: MEDICARE

## 2024-06-17 DIAGNOSIS — D48.5 BASALOID NEOPLASM OF UNCERTAIN BEHAVIOR OF SKIN: Primary | ICD-10-CM

## 2024-06-17 PROCEDURE — 99024 POSTOP FOLLOW-UP VISIT: CPT | Performed by: OTOLARYNGOLOGY

## 2024-06-17 NOTE — PROGRESS NOTES
Patient presents for suture removal. The incisions are well-approximated with no redness or edema noted. Patient notified of path

## 2024-09-05 ENCOUNTER — PROCEDURE VISIT (OUTPATIENT)
Dept: OTOLARYNGOLOGY | Facility: CLINIC | Age: 79
End: 2024-09-05
Payer: MEDICARE

## 2024-09-05 DIAGNOSIS — D48.5 BASALOID NEOPLASM OF UNCERTAIN BEHAVIOR OF SKIN: Primary | ICD-10-CM

## 2024-09-05 NOTE — PROGRESS NOTES
PROCEDURE NOTE    Marilyn Landry    DATE OF PROCEDURE: 09/05/2024    PROCEDURE:   Excision of basal cell malignancy with complex closure    PREPROCEDURE DIAGNOSIS:   Basal cell malignancy of the left jawline    POSTPROCEDURE DIAGNOSIS:  SAME    ANESTHESIA:   Injected 1% Lidocaine with 1:100,000 parts epinephrine solution - 3 cc    PROCEDURE DESCRIPTION:    The patient was prepped and draped in the usual fashion.  Following the injection of local anesthesia circumferential elliptical excision was accomplished of the lesion of the left jawline without difficulty utilizing a #15 blade.  Excision was 1.8 x 0.6 cm.  The lesion was 0.4 x 0.4 cm.  The margin was 1 mm.  The excision extended to subcutaneous tissue.    Reapproximation was accomplished with interrupted 5-0 Monocryl with running locked 5-0 nylon to reapproximate the epidermis.    Bacitracin ointment was applied and the procedure terminated.  The patient tolerated the procedure well. There were no complications.

## 2024-09-16 ENCOUNTER — OFFICE VISIT (OUTPATIENT)
Dept: OTOLARYNGOLOGY | Facility: CLINIC | Age: 79
End: 2024-09-16
Payer: MEDICARE

## 2024-09-16 DIAGNOSIS — D48.5 BASALOID NEOPLASM OF UNCERTAIN BEHAVIOR OF SKIN: Primary | ICD-10-CM

## 2024-09-16 PROCEDURE — 99024 POSTOP FOLLOW-UP VISIT: CPT | Performed by: OTOLARYNGOLOGY

## 2025-01-22 ENCOUNTER — OFFICE VISIT (OUTPATIENT)
Dept: OTOLARYNGOLOGY | Facility: CLINIC | Age: 80
End: 2025-01-22
Payer: MEDICARE

## 2025-01-22 VITALS
WEIGHT: 140 LBS | TEMPERATURE: 97.3 F | DIASTOLIC BLOOD PRESSURE: 75 MMHG | BODY MASS INDEX: 26.43 KG/M2 | HEART RATE: 58 BPM | SYSTOLIC BLOOD PRESSURE: 164 MMHG | HEIGHT: 61 IN

## 2025-01-22 DIAGNOSIS — Z85.9 HISTORY OF SQUAMOUS CELL CARCINOMA EXCISION: ICD-10-CM

## 2025-01-22 DIAGNOSIS — Z98.890 HISTORY OF SQUAMOUS CELL CARCINOMA EXCISION: ICD-10-CM

## 2025-01-22 DIAGNOSIS — D48.5 BASALOID NEOPLASM OF UNCERTAIN BEHAVIOR OF SKIN: Primary | ICD-10-CM

## 2025-01-22 RX ORDER — LISINOPRIL 40 MG/1
1 TABLET ORAL DAILY
COMMUNITY

## 2025-01-22 NOTE — PATIENT INSTRUCTIONS
CONTACT INFORMATION:  The main office phone number is [phone]. For emergencies after hours and on weekends, this number will convert over to our answering service and the on call provider will answer. Please try to keep non emergent phone calls/ questions to office hours 9am-5pm Monday through Friday.      [employer]  As an alternative, you can sign up and use the Epic MyChart system for more direct and quicker access for non emergent questions/ problems.  [employer] allows you to send messages to your doctor, view your test results, renew your prescriptions, schedule appointments, and more. To sign up, go to [employer] and click on the Sign Up Now link in the New User? box. Enter your [employer] Activation Code exactly as it appears below along with the last four digits of your Social Security Number and your Date of Birth () to complete the sign-up process. If you do not sign up before the expiration date, you must request a new code.     [employer] Activation Code: Activation code not generated  Current [employer] Status: Active     If you have questions, you can email [email or web address] or call [phone] to talk to our [employer] staff. Remember, [employer] is NOT to be used for urgent needs. For medical emergencies, dial 911.     IF YOU SMOKE OR USE TOBACCO PLEASE READ THE FOLLOWING:  Why is smoking bad for me?  Smoking increases the risk of heart disease, lung disease, vascular disease, stroke, and cancer. If you smoke, STOP!        IF YOU SMOKE OR USE TOBACCO PLEASE READ THE FOLLOWING:  Why is smoking bad for me?  Smoking increases the risk of heart disease, lung disease, vascular disease, stroke, and cancer. If you smoke, STOP!     For more information:  Quit Now [place]  -QUIT-NOW  [email or web address]

## 2025-01-22 NOTE — PROGRESS NOTES
YOB: 1945  Location: [facility]  Location Address: [address] [place] [address], [facility], [address] [place] [address]  Location Phone: [phone]    Chief Complaint   Patient presents with    Skin Lesion       History of Present Illness  Marilyn Landry is a 79 y.o. female.  Marilyn Landry is here for follow up of ENT complaints. 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  Patient has had a relatively normal postoperative course.  She sees dermatology every 6 months.  She denies any recurrence.    Reviewed:     Past Medical History:   Diagnosis Date    Disease of thyroid gland     GERD (gastroesophageal reflux disease)     Glaucoma     High blood pressure     High cholesterol     Irritable bowel     Neuropathy of both feet     SCC (squamous cell carcinoma)        Past Surgical History:   Procedure Laterality Date    CATARACT EXTRACTION, BILATERAL      COLONOSCOPY      EXCISION LESION Right 2023    Procedure: EXCISION OF SQUAMOUS CELL CARCINOMA OF THE RIGHT FOREHEAD with FLAP;  Surgeon: Jeffrey Parks MD;  Location: [facility] OR;  Service: ENT;  Laterality: Right;    EXCISION LESION Left 2024    Procedure: Excision of basaloid neoplasm of the left jawline with complex closure 3 mm punch biopsy left inferior jawline;  Surgeon: Jeffrey Parks MD;  Location: [facility] OR;  Service: ENT;  Laterality: Left;    GALLBLADDER SURGERY      HYSTERECTOMY      REPLACEMENT TOTAL KNEE Left        No outpatient medications have been marked as taking for the 25 encounter (Office Visit) with Rory Page APRN.       Penicillins and Sulfa antibiotics    History reviewed. No pertinent family history.    Social History     Socioeconomic History    Marital status:    Tobacco Use    Smoking status: Never    Smokeless tobacco: Never   Vaping Use    Vaping status: Never Used   Substance and Sexual Activity    Alcohol use: Never    Drug use: Never    Sexual activity:  Defer       Review of Systems   Constitutional: Negative.    HENT: Negative.         Vitals:    01/22/25 1000   BP: 164/75   Pulse: 58   Temp: 97.3 °F (36.3 °C)       Body mass index is 26.44 kg/m².    Objective     Physical Exam  Vitals reviewed.   Constitutional:       Appearance: Normal appearance.   HENT:      Head: Normocephalic.        Right Ear: External ear normal.      Left Ear: External ear normal.      Nose: Nose normal.      Mouth/Throat:      Lips: Pink.      Mouth: Mucous membranes are moist.   Musculoskeletal:      Cervical back: Full passive range of motion without pain.   Neurological:      Mental Status: She is alert.   Psychiatric:         Behavior: Behavior is cooperative.         Assessment & Plan   Diagnoses and all orders for this visit:    1. 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 History of squamous cell carcinoma excision      * Surgery not found *  No orders of the defined types were placed in this encounter.    Keep follow-up with dermatology  Return for problems    Return if symptoms worsen or fail to improve.       Patient Instructions   CONTACT INFORMATION:  The main office phone number is [phone]. For emergencies after hours and on weekends, this number will convert over to our answering service and the on call provider will answer. Please try to keep non emergent phone calls/ questions to office hours 9am-5pm Monday through Friday.      [employer]ber[employer]  As an alternative, you can sign up and use the Epic MyChart system for more direct and quicker access for non emergent questions/ problems.  [facility] Ibercheck allows you to send messages to your doctor, view your test results, renew your prescriptions, schedule appointments, and more. To sign up, go to [employer] and click on the Sign Up Now link in the New User? box. Enter your Iber[employer] Activation Code exactly as it appears below along with the last four digits of your Social Security Number  and your Date of Birth () to complete the sign-up process. If you do not sign up before the expiration date, you must request a new code.     [employer] Activation Code: Activation code not generated  Current [employer] Status: Active     If you have questions, you can email [email or web address] or call [phone] to talk to our [employer] staff. Remember, [employer] is NOT to be used for urgent needs. For medical emergencies, dial 911.     IF YOU SMOKE OR USE TOBACCO PLEASE READ THE FOLLOWING:  Why is smoking bad for me?  Smoking increases the risk of heart disease, lung disease, vascular disease, stroke, and cancer. If you smoke, STOP!        IF YOU SMOKE OR USE TOBACCO PLEASE READ THE FOLLOWING:  Why is smoking bad for me?  Smoking increases the risk of heart disease, lung disease, vascular disease, stroke, and cancer. If you smoke, STOP!     For more information:  Quit Now [place]  -QUIT-NOW  [email or web address]

## (undated) DEVICE — GLV SURG BIOGEL M LTX PF 7 1/2

## (undated) DEVICE — SUT ETHLN 6/0 P3 18IN 1698G

## (undated) DEVICE — TRAP FLD MINIVAC MEGADYNE 100ML

## (undated) DEVICE — 4-PORT MANIFOLD: Brand: NEPTUNE 2

## (undated) DEVICE — ELECTRD BLD EZ CLN MOD XLNG 2.75IN

## (undated) DEVICE — SUT MNCRYL 4/0 P3 18IN UD MCP494G

## (undated) DEVICE — PK ENT HD AND NK 30

## (undated) DEVICE — PREP SOL POVIDONE/IODINE BT 4OZ

## (undated) DEVICE — SUT ETHLN 5/0 P3 18IN 698H

## (undated) DEVICE — SPNG GZ WOVN 4X4IN 12PLY 10/BX STRL

## (undated) DEVICE — BAPTIST TURNOVER KIT: Brand: MEDLINE INDUSTRIES, INC.

## (undated) DEVICE — POSITIONER,HEAD,MULTIRING,36CS: Brand: MEDLINE

## (undated) DEVICE — HDRST POSITIONING FM RND 2X9IN